# Patient Record
Sex: FEMALE | Race: BLACK OR AFRICAN AMERICAN | Employment: FULL TIME | URBAN - METROPOLITAN AREA
[De-identification: names, ages, dates, MRNs, and addresses within clinical notes are randomized per-mention and may not be internally consistent; named-entity substitution may affect disease eponyms.]

---

## 2019-04-04 ENCOUNTER — APPOINTMENT (OUTPATIENT)
Dept: CT IMAGING | Age: 24
DRG: 637 | End: 2019-04-04
Attending: EMERGENCY MEDICINE
Payer: SELF-PAY

## 2019-04-04 ENCOUNTER — HOSPITAL ENCOUNTER (INPATIENT)
Age: 24
LOS: 4 days | Discharge: HOME | DRG: 637 | End: 2019-04-08
Attending: EMERGENCY MEDICINE | Admitting: INTERNAL MEDICINE
Payer: SELF-PAY

## 2019-04-04 ENCOUNTER — APPOINTMENT (OUTPATIENT)
Dept: GENERAL RADIOLOGY | Age: 24
DRG: 637 | End: 2019-04-04
Attending: EMERGENCY MEDICINE
Payer: SELF-PAY

## 2019-04-04 DIAGNOSIS — E13.11 DIABETIC KETOACIDOSIS WITH COMA ASSOCIATED WITH OTHER SPECIFIED DIABETES MELLITUS (HCC): Primary | ICD-10-CM

## 2019-04-04 DIAGNOSIS — G93.41 METABOLIC ENCEPHALOPATHY: ICD-10-CM

## 2019-04-04 PROBLEM — A41.9 SEPSIS (HCC): Status: ACTIVE | Noted: 2019-04-04

## 2019-04-04 PROBLEM — E87.20 METABOLIC ACIDOSIS: Status: ACTIVE | Noted: 2019-04-04

## 2019-04-04 PROBLEM — E11.10 DKA (DIABETIC KETOACIDOSES): Status: ACTIVE | Noted: 2019-04-04

## 2019-04-04 LAB
ADMINISTERED INITIALS, ADMINIT: NORMAL
ALBUMIN SERPL-MCNC: 3.5 G/DL (ref 3.5–5)
ALBUMIN/GLOB SERPL: 0.7 {RATIO} (ref 1.1–2.2)
ALP SERPL-CCNC: 197 U/L (ref 45–117)
ALT SERPL-CCNC: 36 U/L (ref 12–78)
AMPHET UR QL SCN: NEGATIVE
ANION GAP BLD CALC-SCNC: 26 MMOL/L (ref 10–20)
ANION GAP SERPL CALC-SCNC: 13 MMOL/L (ref 5–15)
ANION GAP SERPL CALC-SCNC: 22 MMOL/L (ref 5–15)
ANION GAP SERPL CALC-SCNC: 25 MMOL/L (ref 5–15)
APPEARANCE UR: CLEAR
ARTERIAL PATENCY WRIST A: ABNORMAL
ARTERIAL PATENCY WRIST A: ABNORMAL
AST SERPL-CCNC: 17 U/L (ref 15–37)
ATRIAL RATE: 117 BPM
BACTERIA URNS QL MICRO: NEGATIVE /HPF
BARBITURATES UR QL SCN: NEGATIVE
BASE DEFICIT BLDV-SCNC: <30 MMOL/L
BASOPHILS # BLD: 0.2 K/UL (ref 0–0.1)
BASOPHILS NFR BLD: 1 % (ref 0–1)
BDY SITE: ABNORMAL
BDY SITE: ABNORMAL
BENZODIAZ UR QL: NEGATIVE
BILIRUB SERPL-MCNC: 0.4 MG/DL (ref 0.2–1)
BILIRUB UR QL: NEGATIVE
BUN BLD-MCNC: 21 MG/DL (ref 9–20)
BUN SERPL-MCNC: 17 MG/DL (ref 6–20)
BUN SERPL-MCNC: 22 MG/DL (ref 6–20)
BUN SERPL-MCNC: 23 MG/DL (ref 6–20)
BUN/CREAT SERPL: 15 (ref 12–20)
BUN/CREAT SERPL: 17 (ref 12–20)
BUN/CREAT SERPL: 19 (ref 12–20)
CA-I BLD-MCNC: 1.33 MMOL/L (ref 1.12–1.32)
CALCIUM SERPL-MCNC: 8.2 MG/DL (ref 8.5–10.1)
CALCIUM SERPL-MCNC: 8.3 MG/DL (ref 8.5–10.1)
CALCIUM SERPL-MCNC: 9.3 MG/DL (ref 8.5–10.1)
CALCULATED P AXIS, ECG09: 75 DEGREES
CALCULATED R AXIS, ECG10: 76 DEGREES
CALCULATED T AXIS, ECG11: 41 DEGREES
CANNABINOIDS UR QL SCN: NEGATIVE
CHLORIDE BLD-SCNC: 111 MMOL/L (ref 98–107)
CHLORIDE SERPL-SCNC: 105 MMOL/L (ref 97–108)
CHLORIDE SERPL-SCNC: 118 MMOL/L (ref 97–108)
CHLORIDE SERPL-SCNC: 120 MMOL/L (ref 97–108)
CO2 BLD-SCNC: 6 MMOL/L (ref 21–32)
CO2 SERPL-SCNC: 14 MMOL/L (ref 21–32)
CO2 SERPL-SCNC: 5 MMOL/L (ref 21–32)
CO2 SERPL-SCNC: 5 MMOL/L (ref 21–32)
COCAINE UR QL SCN: NEGATIVE
COLOR UR: ABNORMAL
CREAT BLD-MCNC: 0.9 MG/DL (ref 0.6–1.3)
CREAT SERPL-MCNC: 0.98 MG/DL (ref 0.55–1.02)
CREAT SERPL-MCNC: 1.18 MG/DL (ref 0.55–1.02)
CREAT SERPL-MCNC: 1.49 MG/DL (ref 0.55–1.02)
D50 ADMINISTERED, D50ADM: 0 ML
D50 ORDER, D50ORD: 0 ML
DIAGNOSIS, 93000: NORMAL
DIFFERENTIAL METHOD BLD: ABNORMAL
DRUG SCRN COMMENT,DRGCM: NORMAL
EOSINOPHIL # BLD: 0 K/UL (ref 0–0.4)
EOSINOPHIL NFR BLD: 0 % (ref 0–7)
EPITH CASTS URNS QL MICRO: ABNORMAL /LPF
ERYTHROCYTE [DISTWIDTH] IN BLOOD BY AUTOMATED COUNT: 12.8 % (ref 11.5–14.5)
GAS FLOW.O2 O2 DELIVERY SYS: ABNORMAL L/MIN
GAS FLOW.O2 O2 DELIVERY SYS: ABNORMAL L/MIN
GLOBULIN SER CALC-MCNC: 4.9 G/DL (ref 2–4)
GLSCOM COMMENTS: NORMAL
GLUCOSE BLD STRIP.AUTO-MCNC: 156 MG/DL (ref 65–100)
GLUCOSE BLD STRIP.AUTO-MCNC: 169 MG/DL (ref 65–100)
GLUCOSE BLD STRIP.AUTO-MCNC: 171 MG/DL (ref 65–100)
GLUCOSE BLD STRIP.AUTO-MCNC: 183 MG/DL (ref 65–100)
GLUCOSE BLD STRIP.AUTO-MCNC: 254 MG/DL (ref 65–100)
GLUCOSE BLD STRIP.AUTO-MCNC: 377 MG/DL (ref 65–100)
GLUCOSE BLD STRIP.AUTO-MCNC: 589 MG/DL (ref 65–100)
GLUCOSE BLD STRIP.AUTO-MCNC: >600 MG/DL (ref 65–100)
GLUCOSE BLD-MCNC: >700 MG/DL (ref 65–100)
GLUCOSE SERPL-MCNC: 170 MG/DL (ref 65–100)
GLUCOSE SERPL-MCNC: 505 MG/DL (ref 65–100)
GLUCOSE SERPL-MCNC: 808 MG/DL (ref 65–100)
GLUCOSE UR STRIP.AUTO-MCNC: >1000 MG/DL
GLUCOSE, GLC: 156 MG/DL
GLUCOSE, GLC: 169 MG/DL
GLUCOSE, GLC: 171 MG/DL
GLUCOSE, GLC: 183 MG/DL
GLUCOSE, GLC: 254 MG/DL
GLUCOSE, GLC: 377 MG/DL
GLUCOSE, GLC: 589 MG/DL
GLUCOSE, GLC: 601 MG/DL
GLUCOSE, GLC: 601 MG/DL
HCG SERPL QL: NEGATIVE
HCO3 BLDV-SCNC: 3.1 MMOL/L (ref 23–28)
HCT VFR BLD AUTO: 45.7 % (ref 35–47)
HCT VFR BLD CALC: 48 % (ref 35–47)
HGB BLD-MCNC: 14.3 G/DL (ref 11.5–16)
HGB UR QL STRIP: ABNORMAL
HIGH TARGET, HITG: 250 MG/DL
HYALINE CASTS URNS QL MICRO: ABNORMAL /LPF (ref 0–5)
IMM GRANULOCYTES # BLD AUTO: 0.2 K/UL (ref 0–0.04)
IMM GRANULOCYTES NFR BLD AUTO: 1 % (ref 0–0.5)
INSULIN ADMINSTERED, INSADM: 1.9 UNITS/HOUR
INSULIN ADMINSTERED, INSADM: 10.8 UNITS/HOUR
INSULIN ADMINSTERED, INSADM: 15.9 UNITS/HOUR
INSULIN ADMINSTERED, INSADM: 16.2 UNITS/HOUR
INSULIN ADMINSTERED, INSADM: 2.2 UNITS/HOUR
INSULIN ADMINSTERED, INSADM: 2.2 UNITS/HOUR
INSULIN ADMINSTERED, INSADM: 2.5 UNITS/HOUR
INSULIN ADMINSTERED, INSADM: 3.9 UNITS/HOUR
INSULIN ADMINSTERED, INSADM: 6.3 UNITS/HOUR
INSULIN ORDER, INSORD: 1.9 UNITS/HOUR
INSULIN ORDER, INSORD: 10.8 UNITS/HOUR
INSULIN ORDER, INSORD: 15.9 UNITS/HOUR
INSULIN ORDER, INSORD: 16.2 UNITS/HOUR
INSULIN ORDER, INSORD: 2.2 UNITS/HOUR
INSULIN ORDER, INSORD: 2.2 UNITS/HOUR
INSULIN ORDER, INSORD: 2.5 UNITS/HOUR
INSULIN ORDER, INSORD: 3.9 UNITS/HOUR
INSULIN ORDER, INSORD: 6.3 UNITS/HOUR
KETONES SERPL QL: ABNORMAL
KETONES UR QL STRIP.AUTO: 80 MG/DL
LACTATE BLD-SCNC: 3.11 MMOL/L (ref 0.4–2)
LACTATE SERPL-SCNC: 1.2 MMOL/L (ref 0.4–2)
LACTATE SERPL-SCNC: 2.4 MMOL/L (ref 0.4–2)
LEUKOCYTE ESTERASE UR QL STRIP.AUTO: NEGATIVE
LOW TARGET, LOT: 150 MG/DL
LYMPHOCYTES # BLD: 2.4 K/UL (ref 0.8–3.5)
LYMPHOCYTES NFR BLD: 10 % (ref 12–49)
MAGNESIUM SERPL-MCNC: 2.5 MG/DL (ref 1.6–2.4)
MAGNESIUM SERPL-MCNC: 2.7 MG/DL (ref 1.6–2.4)
MCH RBC QN AUTO: 27.6 PG (ref 26–34)
MCHC RBC AUTO-ENTMCNC: 31.3 G/DL (ref 30–36.5)
MCV RBC AUTO: 88.1 FL (ref 80–99)
METHADONE UR QL: NEGATIVE
MINUTES UNTIL NEXT BG, NBG: 60 MIN
MONOCYTES # BLD: 1.1 K/UL (ref 0–1)
MONOCYTES NFR BLD: 5 % (ref 5–13)
MULTIPLIER, MUL: 0.02
MULTIPLIER, MUL: 0.03
MULTIPLIER, MUL: 0.03
NEUTS SEG # BLD: 19.2 K/UL (ref 1.8–8)
NEUTS SEG NFR BLD: 83 % (ref 32–75)
NITRITE UR QL STRIP.AUTO: NEGATIVE
NRBC # BLD: 0 K/UL (ref 0–0.01)
NRBC BLD-RTO: 0 PER 100 WBC
OPIATES UR QL: NEGATIVE
ORDER INITIALS, ORDINIT: NORMAL
P-R INTERVAL, ECG05: 138 MS
PCO2 BLDV: 18.8 MMHG (ref 41–51)
PCO2 BLDV: <17 MMHG (ref 41–51)
PCP UR QL: NEGATIVE
PH BLDV: 6.83 [PH] (ref 7.32–7.42)
PH BLDV: 6.91 [PH] (ref 7.32–7.42)
PH UR STRIP: 5 [PH] (ref 5–8)
PHOSPHATE SERPL-MCNC: 1.4 MG/DL (ref 2.6–4.7)
PHOSPHATE SERPL-MCNC: 4.2 MG/DL (ref 2.6–4.7)
PLATELET # BLD AUTO: 512 K/UL (ref 150–400)
PMV BLD AUTO: 10.1 FL (ref 8.9–12.9)
PO2 BLDV: 53 MMHG (ref 25–40)
PO2 BLDV: 68 MMHG (ref 25–40)
POTASSIUM BLD-SCNC: 5.5 MMOL/L (ref 3.5–5.1)
POTASSIUM SERPL-SCNC: 3.7 MMOL/L (ref 3.5–5.1)
POTASSIUM SERPL-SCNC: 4.1 MMOL/L (ref 3.5–5.1)
POTASSIUM SERPL-SCNC: 5.4 MMOL/L (ref 3.5–5.1)
PROCALCITONIN SERPL-MCNC: <0.1 NG/ML
PROT SERPL-MCNC: 8.4 G/DL (ref 6.4–8.2)
PROT UR STRIP-MCNC: 100 MG/DL
Q-T INTERVAL, ECG07: 372 MS
QRS DURATION, ECG06: 88 MS
QTC CALCULATION (BEZET), ECG08: 518 MS
RBC # BLD AUTO: 5.19 M/UL (ref 3.8–5.2)
RBC #/AREA URNS HPF: ABNORMAL /HPF (ref 0–5)
SAO2 % BLDV: 73 % (ref 65–88)
SERVICE CMNT-IMP: ABNORMAL
SODIUM BLD-SCNC: 137 MMOL/L (ref 136–145)
SODIUM SERPL-SCNC: 135 MMOL/L (ref 136–145)
SODIUM SERPL-SCNC: 145 MMOL/L (ref 136–145)
SODIUM SERPL-SCNC: 147 MMOL/L (ref 136–145)
SP GR UR REFRACTOMETRY: 1.02 (ref 1–1.03)
SPECIMEN TYPE: ABNORMAL
SPECIMEN TYPE: ABNORMAL
TOTAL RESP. RATE, ITRR: 24
TOTAL RESP. RATE, ITRR: 25
TROPONIN I SERPL-MCNC: <0.05 NG/ML
UA: UC IF INDICATED,UAUC: ABNORMAL
UROBILINOGEN UR QL STRIP.AUTO: 0.2 EU/DL (ref 0.2–1)
VENTRICULAR RATE, ECG03: 117 BPM
WBC # BLD AUTO: 23.1 K/UL (ref 3.6–11)
WBC URNS QL MICRO: ABNORMAL /HPF (ref 0–4)

## 2019-04-04 PROCEDURE — 74011250636 HC RX REV CODE- 250/636: Performed by: INTERNAL MEDICINE

## 2019-04-04 PROCEDURE — 74011000250 HC RX REV CODE- 250: Performed by: INTERNAL MEDICINE

## 2019-04-04 PROCEDURE — 65610000006 HC RM INTENSIVE CARE

## 2019-04-04 PROCEDURE — 99285 EMERGENCY DEPT VISIT HI MDM: CPT

## 2019-04-04 PROCEDURE — 84145 PROCALCITONIN (PCT): CPT

## 2019-04-04 PROCEDURE — 96367 TX/PROPH/DG ADDL SEQ IV INF: CPT

## 2019-04-04 PROCEDURE — 80048 BASIC METABOLIC PNL TOTAL CA: CPT

## 2019-04-04 PROCEDURE — 82803 BLOOD GASES ANY COMBINATION: CPT

## 2019-04-04 PROCEDURE — 74011636320 HC RX REV CODE- 636/320: Performed by: EMERGENCY MEDICINE

## 2019-04-04 PROCEDURE — 87040 BLOOD CULTURE FOR BACTERIA: CPT

## 2019-04-04 PROCEDURE — 83605 ASSAY OF LACTIC ACID: CPT

## 2019-04-04 PROCEDURE — 74011000258 HC RX REV CODE- 258: Performed by: INTERNAL MEDICINE

## 2019-04-04 PROCEDURE — 74011636637 HC RX REV CODE- 636/637: Performed by: EMERGENCY MEDICINE

## 2019-04-04 PROCEDURE — 80047 BASIC METABLC PNL IONIZED CA: CPT

## 2019-04-04 PROCEDURE — 74011000258 HC RX REV CODE- 258: Performed by: EMERGENCY MEDICINE

## 2019-04-04 PROCEDURE — 74177 CT ABD & PELVIS W/CONTRAST: CPT

## 2019-04-04 PROCEDURE — 36415 COLL VENOUS BLD VENIPUNCTURE: CPT

## 2019-04-04 PROCEDURE — 84703 CHORIONIC GONADOTROPIN ASSAY: CPT

## 2019-04-04 PROCEDURE — 74011250636 HC RX REV CODE- 250/636: Performed by: EMERGENCY MEDICINE

## 2019-04-04 PROCEDURE — 85025 COMPLETE CBC W/AUTO DIFF WBC: CPT

## 2019-04-04 PROCEDURE — 82962 GLUCOSE BLOOD TEST: CPT

## 2019-04-04 PROCEDURE — 93005 ELECTROCARDIOGRAM TRACING: CPT

## 2019-04-04 PROCEDURE — 96375 TX/PRO/DX INJ NEW DRUG ADDON: CPT

## 2019-04-04 PROCEDURE — 84100 ASSAY OF PHOSPHORUS: CPT

## 2019-04-04 PROCEDURE — 80307 DRUG TEST PRSMV CHEM ANLYZR: CPT

## 2019-04-04 PROCEDURE — 74011250637 HC RX REV CODE- 250/637: Performed by: INTERNAL MEDICINE

## 2019-04-04 PROCEDURE — 71045 X-RAY EXAM CHEST 1 VIEW: CPT

## 2019-04-04 PROCEDURE — 74011000250 HC RX REV CODE- 250: Performed by: EMERGENCY MEDICINE

## 2019-04-04 PROCEDURE — 81001 URINALYSIS AUTO W/SCOPE: CPT

## 2019-04-04 PROCEDURE — 74011000258 HC RX REV CODE- 258: Performed by: HOSPITALIST

## 2019-04-04 PROCEDURE — 82009 KETONE BODYS QUAL: CPT

## 2019-04-04 PROCEDURE — 80053 COMPREHEN METABOLIC PANEL: CPT

## 2019-04-04 PROCEDURE — 83735 ASSAY OF MAGNESIUM: CPT

## 2019-04-04 PROCEDURE — 96361 HYDRATE IV INFUSION ADD-ON: CPT

## 2019-04-04 PROCEDURE — 77030013079 HC BLNKT BAIR HGGR 3M -A

## 2019-04-04 PROCEDURE — 96365 THER/PROPH/DIAG IV INF INIT: CPT

## 2019-04-04 PROCEDURE — 70450 CT HEAD/BRAIN W/O DYE: CPT

## 2019-04-04 PROCEDURE — 84484 ASSAY OF TROPONIN QUANT: CPT

## 2019-04-04 RX ORDER — MUPIROCIN 20 MG/G
OINTMENT TOPICAL EVERY 12 HOURS
Status: DISCONTINUED | OUTPATIENT
Start: 2019-04-04 | End: 2019-04-08 | Stop reason: HOSPADM

## 2019-04-04 RX ORDER — INSULIN LISPRO 100 [IU]/ML
INJECTION, SOLUTION INTRAVENOUS; SUBCUTANEOUS
COMMUNITY

## 2019-04-04 RX ORDER — SODIUM CHLORIDE 0.9 % (FLUSH) 0.9 %
5-40 SYRINGE (ML) INJECTION EVERY 8 HOURS
Status: DISCONTINUED | OUTPATIENT
Start: 2019-04-04 | End: 2019-04-08 | Stop reason: HOSPADM

## 2019-04-04 RX ORDER — SODIUM CHLORIDE 450 MG/100ML
200 INJECTION, SOLUTION INTRAVENOUS CONTINUOUS
Status: DISCONTINUED | OUTPATIENT
Start: 2019-04-04 | End: 2019-04-04

## 2019-04-04 RX ORDER — LORAZEPAM 2 MG/ML
1 INJECTION INTRAMUSCULAR ONCE
Status: COMPLETED | OUTPATIENT
Start: 2019-04-04 | End: 2019-04-04

## 2019-04-04 RX ORDER — SODIUM BICARBONATE 84 MG/ML
50 INJECTION, SOLUTION INTRAVENOUS
Status: COMPLETED | OUTPATIENT
Start: 2019-04-04 | End: 2019-04-04

## 2019-04-04 RX ORDER — SODIUM CHLORIDE 9 MG/ML
200 INJECTION, SOLUTION INTRAVENOUS CONTINUOUS
Status: DISCONTINUED | OUTPATIENT
Start: 2019-04-04 | End: 2019-04-04

## 2019-04-04 RX ORDER — HYDROCODONE BITARTRATE AND ACETAMINOPHEN 5; 325 MG/1; MG/1
1 TABLET ORAL
Status: DISCONTINUED | OUTPATIENT
Start: 2019-04-04 | End: 2019-04-06

## 2019-04-04 RX ORDER — SODIUM CHLORIDE 0.9 % (FLUSH) 0.9 %
5-40 SYRINGE (ML) INJECTION AS NEEDED
Status: DISCONTINUED | OUTPATIENT
Start: 2019-04-04 | End: 2019-04-08 | Stop reason: HOSPADM

## 2019-04-04 RX ORDER — MAGNESIUM SULFATE 100 %
4 CRYSTALS MISCELLANEOUS AS NEEDED
Status: DISCONTINUED | OUTPATIENT
Start: 2019-04-04 | End: 2019-04-05

## 2019-04-04 RX ORDER — IBUPROFEN 200 MG
TABLET ORAL
COMMUNITY

## 2019-04-04 RX ORDER — SODIUM BICARBONATE 84 MG/ML
100 INJECTION, SOLUTION INTRAVENOUS ONCE
Status: COMPLETED | OUTPATIENT
Start: 2019-04-04 | End: 2019-04-04

## 2019-04-04 RX ORDER — MORPHINE SULFATE 2 MG/ML
1 INJECTION, SOLUTION INTRAMUSCULAR; INTRAVENOUS
Status: DISCONTINUED | OUTPATIENT
Start: 2019-04-04 | End: 2019-04-05

## 2019-04-04 RX ORDER — ONDANSETRON 2 MG/ML
4 INJECTION INTRAMUSCULAR; INTRAVENOUS
Status: DISCONTINUED | OUTPATIENT
Start: 2019-04-04 | End: 2019-04-08 | Stop reason: HOSPADM

## 2019-04-04 RX ORDER — HEPARIN SODIUM 5000 [USP'U]/ML
5000 INJECTION, SOLUTION INTRAVENOUS; SUBCUTANEOUS EVERY 8 HOURS
Status: DISCONTINUED | OUTPATIENT
Start: 2019-04-04 | End: 2019-04-08 | Stop reason: HOSPADM

## 2019-04-04 RX ORDER — VANCOMYCIN HYDROCHLORIDE
1250
Status: DISCONTINUED | OUTPATIENT
Start: 2019-04-04 | End: 2019-04-04

## 2019-04-04 RX ORDER — DEXTROSE MONOHYDRATE 25 G/50ML
25-50 INJECTION, SOLUTION INTRAVENOUS AS NEEDED
Status: DISCONTINUED | OUTPATIENT
Start: 2019-04-04 | End: 2019-04-05

## 2019-04-04 RX ORDER — SODIUM CHLORIDE 0.9 % (FLUSH) 0.9 %
10 SYRINGE (ML) INJECTION
Status: COMPLETED | OUTPATIENT
Start: 2019-04-04 | End: 2019-04-04

## 2019-04-04 RX ORDER — DEXTROSE MONOHYDRATE AND SODIUM CHLORIDE 5; .45 G/100ML; G/100ML
125 INJECTION, SOLUTION INTRAVENOUS CONTINUOUS
Status: DISCONTINUED | OUTPATIENT
Start: 2019-04-04 | End: 2019-04-05

## 2019-04-04 RX ORDER — NALOXONE HYDROCHLORIDE 0.4 MG/ML
0.4 INJECTION, SOLUTION INTRAMUSCULAR; INTRAVENOUS; SUBCUTANEOUS AS NEEDED
Status: DISCONTINUED | OUTPATIENT
Start: 2019-04-04 | End: 2019-04-08 | Stop reason: HOSPADM

## 2019-04-04 RX ADMIN — DEXTROSE MONOHYDRATE AND SODIUM CHLORIDE 125 ML/HR: 5; .45 INJECTION, SOLUTION INTRAVENOUS at 20:45

## 2019-04-04 RX ADMIN — VANCOMYCIN HYDROCHLORIDE 1250 MG: 10 INJECTION, POWDER, LYOPHILIZED, FOR SOLUTION INTRAVENOUS at 15:28

## 2019-04-04 RX ADMIN — SODIUM CHLORIDE 10.8 UNITS/HR: 900 INJECTION, SOLUTION INTRAVENOUS at 13:00

## 2019-04-04 RX ADMIN — SODIUM CHLORIDE 200 ML: 450 INJECTION, SOLUTION INTRAVENOUS at 15:30

## 2019-04-04 RX ADMIN — Medication 10 ML: at 17:30

## 2019-04-04 RX ADMIN — PIPERACILLIN AND TAZOBACTAM 3.38 G: 3; .375 INJECTION, POWDER, FOR SOLUTION INTRAVENOUS at 13:31

## 2019-04-04 RX ADMIN — FAMOTIDINE 20 MG: 10 INJECTION, SOLUTION INTRAVENOUS at 22:07

## 2019-04-04 RX ADMIN — SODIUM CHLORIDE 1000 ML: 900 INJECTION, SOLUTION INTRAVENOUS at 11:02

## 2019-04-04 RX ADMIN — LORAZEPAM 1 MG: 2 INJECTION INTRAMUSCULAR; INTRAVENOUS at 15:21

## 2019-04-04 RX ADMIN — Medication 10 ML: at 13:58

## 2019-04-04 RX ADMIN — SODIUM CHLORIDE 1000 ML: 900 INJECTION, SOLUTION INTRAVENOUS at 17:28

## 2019-04-04 RX ADMIN — IOPAMIDOL 100 ML: 755 INJECTION, SOLUTION INTRAVENOUS at 13:58

## 2019-04-04 RX ADMIN — HEPARIN SODIUM 5000 UNITS: 5000 INJECTION INTRAVENOUS; SUBCUTANEOUS at 22:11

## 2019-04-04 RX ADMIN — SODIUM CHLORIDE 1000 ML: 900 INJECTION, SOLUTION INTRAVENOUS at 11:27

## 2019-04-04 RX ADMIN — SODIUM BICARBONATE 100 MEQ: 84 INJECTION, SOLUTION INTRAVENOUS at 18:59

## 2019-04-04 RX ADMIN — MUPIROCIN: 20 OINTMENT TOPICAL at 22:12

## 2019-04-04 RX ADMIN — SODIUM CHLORIDE 200 ML/HR: 450 INJECTION, SOLUTION INTRAVENOUS at 19:21

## 2019-04-04 RX ADMIN — SODIUM BICARBONATE 50 MEQ: 84 INJECTION, SOLUTION INTRAVENOUS at 12:16

## 2019-04-04 RX ADMIN — CEFTRIAXONE 1 G: 1 INJECTION, POWDER, FOR SOLUTION INTRAMUSCULAR; INTRAVENOUS at 12:08

## 2019-04-04 RX ADMIN — PIPERACILLIN AND TAZOBACTAM 3.38 G: 3; .375 INJECTION, POWDER, FOR SOLUTION INTRAVENOUS at 19:05

## 2019-04-04 RX ADMIN — Medication 10 ML: at 22:13

## 2019-04-04 RX ADMIN — HEPARIN SODIUM 5000 UNITS: 5000 INJECTION INTRAVENOUS; SUBCUTANEOUS at 15:21

## 2019-04-04 NOTE — PROGRESS NOTES
Arrived from ED at 1600, placed into bed and VS obtained. Insulin drip still infusing, will get FS shortly. Chlohex bath quickly given.

## 2019-04-04 NOTE — PROGRESS NOTES
DISCHARGE:  D: Patient with orders to discharge to home.  I: Discharge instructions, medications & follow ups reviewed with patient. Copy of discharge summary given to patient. PIV removed. All belongings packed & sent with patient. Medications filled & picked up at discharge pharmacy.   A: Patient in stable condition. AVSS. Patient had no further questions regarding discharge instructions and medications. Patient transferred out by self & left with boyfriend.  P: [place] [facility] [place]up prenatal appointments next month.     Pharmacy Automatic Renal Dosing Protocol - Antimicrobials Indication for Antimicrobials: sepsis, bloodstream infection Current Regimen of Each Antimicrobial: 
Vancomycin 1260mg once, then 750mg q12h (Start Date ; Day # 1) Zosyn 3.375g q8h Previous Antimicrobial Therapy: 
NA 
 
Goal Level: VANCOMYCIN TROUGH GOAL RANGE Vancomycin Trough: 15 - 20 mcg/mL Date Dose & Interval Measured (mcg/mL) Extrapolated (mcg/mL) Date & time of next level: prior to 0300 dose on 19 Significant Cultures:  
Blood cx pending Radiology / Imaging results: (X-ray, CT scan or MRI): NA 
 
Paralysis, amputations, malnutrition: NA 
 
Labs: 
Recent Labs 19 
1106 CREA 1.49* BUN 23* WBC 23.1* Temp (24hrs), Av °F (36.7 °C), Min:98 °F (36.7 °C), Max:98 °F (36.7 °C) Creatinine Clearance (mL/min) or Dialysis: 52 Impression/Plan:  
Start vancomycin 1250mg once, then 750mg q12h for projected trough level of 16.9. Zosyn 3.375g q8h. Adjust as needed for renal fxn changes. Antimicrobial stop date to be determined Pharmacy will follow daily and adjust medications as appropriate for renal function and/or serum levels. Thank you, 
Cassy Burden, [facility] Recommended duration of therapy 
http://[facility]/[place]/[facility]/Pharmacy/Clinical%20Companion/Duration%20of%20ABX%20therapy. docx Renal Dosing 
http://[facility]/[place]/[facility]/Pharmacy/Clinical%20Companion/Renal%20Dosing%[id number]. pdf

## 2019-04-04 NOTE — DIABETES MGMT
DTC Progress Note Recommendations/ Comments:appreciate consult. Pt currently with BG of >800 mg/dL, and noted to be disoriented, agitated per RN documentation and therefore not appropriate for education at this time. DTC to f/u with pt when pt more appropriate. Please consider continuing insulin gtt until anion gap is less than 12 on two consecutive BMPs and BG is less than 200 mg/dl and transition per hospital guidelines. Current hospital DM medication: insulin gtt Chart reviewed on Sarah Baires. Patient is a 24 y.o. female with known DM on humalog 50/50 at home. A1c:  
No results found for: HBA1C, HGBE8, LUP7IHMH Recent Glucose Results:  
Lab Results Component Value Date/Time  () 04/04/2019 11:06 AM  
 GLUCPOC >700 () 04/04/2019 11:14 AM  
 GLUCPOC >600 () 04/04/2019 11:12 AM  
 GLUCPOC >600 ([facility]) 04/04/2019 11:08 AM  
  
 
Lab Results Component Value Date/Time Creatinine 1.49 (H) 04/04/2019 11:06 AM  
 
Estimated Creatinine Clearance: 52.4 mL/min (A) (based on SCr of 1.49 mg/dL (H)). Active Orders Diet DIET CLEAR LIQUID  
 DIET NPO  
  
 
PO intake: No data found. Will continue to follow as needed. Thank you Hesham Rios [facility] Office: [phone] Time spent: 4 minutes

## 2019-04-04 NOTE — PROGRESS NOTES
Pharmacy Automatic Renal Dosing Protocol - Antimicrobials Indication for Antimicrobials: sepsis Current Regimen of Each Antimicrobial: 
Zosyn 3.375 g IV q12h (Start Date ; Day # 1) Significant Cultures:  
 
 Blood- in process Radiology / Imaging results: (X-ray, CT scan or MRI):  
 CXR: 
IMPRESSION: No acute cardiopulmonary disease. Paralysis, amputations, malnutrition:   
 
Labs: 
Recent Labs 19 
1106 CREA 1.49* BUN 23* WBC 23.1* Temp (24hrs), Av °F (36.7 °C), Min:98 °F (36.7 °C), Max:98 °F (36.7 °C) Creatinine Clearance (mL/min) or Dialysis: 52.4 mL/min Impression/Plan:  
Adjust Zosyn to 3.375 g IV q8h over 4 hours beginning at 1900. Zosyn 3.375 g x1 given at 1330 Antimicrobial stop date TBD Pharmacy will follow daily and adjust medications as appropriate for renal function and/or serum levels. Thank you, 
Hiwot Espino Recommended duration of therapy 
http://[facility]/[place]/[facility]/Pharmacy/Clinical%20Companion/Duration%20of%20ABX%20therapy. docx Renal Dosing 
http://[facility]/[place]/[facility]/Pharmacy/Clinical%20Companion/Renal%20Dosing%[id number]. pdf

## 2019-04-04 NOTE — ED NOTES
Informed Dr. Cal Ugarte that patient is screaming badly and will not sit still and just urinated all over the floor.

## 2019-04-04 NOTE — PROGRESS NOTES
Pharmacy Clarification of the Prior to Admission Medication Regimen Retrospective to the Admission Medication Reconciliation-Follow up Needed The patient was unable to participate in interview regarding clarification of the prior to admission medication regimen. Patient moaning and flailing in her stretcher. When questioned regarding her medications, patient stated 'bag'. MHT located a purple haroldo pack laying beside the patient in the bed. MHT asked to look in the bag, and patient stated 'yeah'. Inside the bag was a [employer], a bottle of ibuprofen, some syringes, and personal items. MHT questioned the patient regarding the insulin pen and IBU, patient kept stating 'I don't know' and asking for 'insulin' and 'trash'. Information Obtained From: medication bottles Recommendations/Findings: The following amendments were made to the patient's active medication list on file at [facility]:  
 
1) Additions:  
ibuprofen (MOTRIN) 200 mg tablet 
insulin lispro (HUMALOG KWIKPEN INSULIN) 100 unit/mL kwikpen 2) Removals:  
Humalog mix 50/50 3) Changes: NONE 4) Pertinent Pharmacy Findings: 
Updated patient?s preferred outpatient pharmacy to: [address] [place] [address] at [address] [facility], [address] [place] [address] The medication history will need to be re-evaluated at a later time during admission when patient is willing/able to participate or if more information is provided. PTA medication list was corrected to the following:  
 
Prior to Admission Medications Prescriptions Last Dose Informant Patient Reported? Taking?  
ibuprofen (MOTRIN) 200 mg tablet Unknown at Unknown time Other Yes No  
insulin lispro (HUMALOG KWIKPEN INSULIN) 100 unit/mL kwikpen Unknown at Unknown time Other Yes No  
  
Facility-Administered Medications: None Thank you, 
Jennifer Rock CPhT Medication History Pharmacy Technician

## 2019-04-04 NOTE — H&P
Hospitalist Admission NoteNAME: Morgan Mario :  1995 MRN:  [id number] Date/Time:  2019 1:46 PM 
 
Patient PCP: Unknown, Provider 
________________________________________________________________________ Given the patient's current clinical presentation, I have a high level of concern for decompensation if discharged from the emergency department. Complex decision making was performed, which includes reviewing the patient's available past medical records, laboratory results, and x-ray films. My assessment of this patient's clinical condition and my plan of care is as follows. Assessment / Plan: 
 
DKA Severe metabolic acidosis Hyperkalemia Pseudohyponatremia Metabolic encephalopathy Will admit to ICU, continue with insulin drip started by the ED physician Patient got 1 amp of bicarb, will follow-up repeat BMP every 4, magnesium and phosphorus every 4  
metabolic acidosis should resolve with the insulin drip, continue with IV fluid with  Half normal saline at 200 cc/h as corrected na 147.  Change the fluid to D5 half normal saline if blood sugar is below 250 Once anion gap closed x2, insulin drip can be stopped and transitioned to home insulin regimen Potassium should come down with the insulin drip C/w neuro-checks . Pt is very agitated , one time ativan ordered QTc > 500 will avoid haldol Acute kidney injury, most likely prerenal 
Creatinine 1.45, continue IV fluid Severe sepsis with unknown origin Lactic acidosis from DKA versus sepsis Tachycardic plus white blood cell count 23K 
UA negative chest x-ray negative CT abdomen and pelvis results : no acute pathology In the meanwhile will cover with broad-spectrum antibiotics IV Zosyn and vancomycin Will get pro-calcitonin. Stop abx if procalcitonin neg Trend lactic acid until resolution Body mass index is 24.73 kg/m². therefore classifying patient as normal wt I have personally reviewed the radiographs, laboratory data in Epic and decisions and statements above are based partially on this personal interpretation. Code Status: Full Code DVT Prophylaxis: Lovenox GI Prophylaxis: not indicated Subjective: CHIEF COMPLAINT: n/v and screaming HISTORY OF PRESENT ILLNESS:    
Nikunj Reynolds is a 24 y.o.  female with known history as listed below presents to ED with complaint noted above. Available records were reviewed at the time of H&P. This is a 23-year-old female with past medical history of diabetes mellitus insulin-dependent who was sent to the ED via EMS for nausea vomiting and abdominal pain and Ams .  Per EMS note, patient was found screaming in 1 of the [facility] room, patient reportedly haven't  been using her insulin for the past 3-4 days for unknown reason. Patient is from out of town and traveling to [facility] to work as an employee 
 All the HPI obtained from the ED notes as patient was found confused and unable to contribute any history. In the ED, patient was found to be tachycardic, her labs showed elevated white blood cell count 23,000 with a left shift, anion gap metabolic acidosis concerning for DKA.  Her blood sugar was able 800.  Her VBG showed severe metabolic acidosis with pH less than 7.  Her creatinine 1.49 When seen, patient was restless screaming and holding her abdomen. We were asked to see for work up and evaluation of the above problems. Past Medical History:  
Diagnosis Date  Diabetes mellitus ([address] History reviewed. No pertinent surgical history. Social History Tobacco Use  Smoking status: Never Smoker  Smokeless tobacco: Never Used Substance Use Topics  Alcohol use: Not on file History reviewed. No pertinent family history. No Known Allergies Prior to Admission medications Medication Sig Start Date End Date Taking? Authorizing Provider insulin lispro protamine/insulin lispro (HUMALOG MIX 50-50 INSULN U-100) 100 unit/mL (50-50) injection by SubCUTAneous route. Yes Other, MD Audie  
 
REVIEW OF SYSTEMS:  See HPI for details Patient was not able to provide review of systems due to mental status change/acute illness Objective: VITALS:   
Visit Vitals /76 Pulse (!) 116 Temp 98 °F (36.7 °C) Resp 24 Ht 5' 5\" (1.651 m) Wt 67.4 kg (148 lb 9.4 oz) SpO2 100% BMI 24.73 kg/m² PHYSICAL EXAM:  
General:    Alert, turning and tossing for unknown reason ,just scream  
HEENT: Atraumatic, anicteric sclerae, pink conjunctivae No oral ulcers, mucosa dry  throat clear Neck:  Supple, symmetrical,  thyroid: non tender Lungs:   Clear to auscultation bilaterally. No Wheezing or Rhonchi. No rales. Chest wall:  No tenderness  No Accessory muscle use. Heart:   Fast Regular  rhythm,  No  murmur   No edema Abdomen:   Soft, non-tender. Not distended. Bowel sounds normal 
Extremities: No cyanosis. No clubbing Skin:     Not pale. Not Jaundiced  No rashes Psych:  Unable to assess Neurologic: Confused , aaox0 _____________________________________________________ Care Plan discussed with: 
  Comments Patient Family RN x Care Manager Consultant:  danna BENITES MD  
_______________________________________________________________________ Recommended Disposition:  
Home with Family HH/PT/OT/RN   
SNF/LTC   
[facility]   
________________________________________________________________________ TOTAL TIME:  65 Minutes Critical Care Provided     Minutes non procedure based Comments >50% of visit spent in counseling and coordination of care x Chart review Discussion with patient and/or family and questions answered  
 
________________________________________________________________________ Signed: Jody Bautista MD 
 
This note will not be viewable in [address] Procedures: see electronic medical records for all procedures/Xrays and details which were not copied into this note but were reviewed prior to creation of Plan. LAB DATA REVIEWED:   
Recent Results (from the past 24 hour(s)) POC LACTIC ACID Collection Time: 04/04/19 11:05 AM  
Result Value Ref Range Lactic Acid (POC) 3.11 (HH) 0.40 - 2.00 mmol/L  
CBC WITH AUTOMATED DIFF Collection Time: 04/04/19 11:06 AM  
Result Value Ref Range WBC 23.1 (H) 3.6 - 11.0 K/uL  
 RBC 5.19 3.80 - 5.20 M/uL  
 HGB 14.3 11.5 - 16.0 g/dL HCT 45.7 35.0 - 47.0 % MCV 88.1 80.0 - 99.0 FL  
 MCH 27.6 26.0 - 34.0 PG  
 MCHC 31.3 30.0 - 36.5 g/dL  
 RDW 12.8 11.5 - 14.5 % PLATELET 883 (H) 499 - 400 K/uL MPV 10.1 8.9 - 12.9 FL  
 NRBC 0.0 0  WBC ABSOLUTE NRBC 0.00 0.00 - 0.01 K/uL NEUTROPHILS 83 (H) 32 - 75 % LYMPHOCYTES 10 (L) 12 - 49 % MONOCYTES 5 5 - 13 % EOSINOPHILS 0 0 - 7 % BASOPHILS 1 0 - 1 % IMMATURE GRANULOCYTES 1 (H) 0.0 - 0.5 % ABS. NEUTROPHILS 19.2 (H) 1.8 - 8.0 K/UL  
 ABS. LYMPHOCYTES 2.4 0.8 - 3.5 K/UL  
 ABS. MONOCYTES 1.1 (H) 0.0 - 1.0 K/UL  
 ABS. EOSINOPHILS 0.0 0.0 - 0.4 K/UL  
 ABS. BASOPHILS 0.2 (H) 0.0 - 0.1 K/UL  
 ABS. IMM. GRANS. 0.2 (H) 0.00 - 0.04 K/UL  
 DF AUTOMATED    
HCG QL SERUM Collection Time: 04/04/19 11:06 AM  
Result Value Ref Range HCG, Ql. NEGATIVE  NEG    
TROPONIN I Collection Time: 04/04/19 11:06 AM  
Result Value Ref Range Troponin-I, Qt. <0.05 <0.05 ng/mL URINALYSIS W/ REFLEX CULTURE Collection Time: 04/04/19 11:06 AM  
Result Value Ref Range Color YELLOW/STRAW Appearance CLEAR CLEAR Specific gravity 1.023 1.003 - 1.030    
 pH (UA) 5.0 5.0 - 8.0 Protein 100 (A) NEG mg/dL Glucose >1,000 (A) NEG mg/dL Ketone 80 (A) NEG mg/dL Bilirubin NEGATIVE  NEG Blood SMALL (A) NEG Urobilinogen 0.2 0.2 - 1.0 EU/dL Nitrites NEGATIVE  NEG  Leukocyte Esterase NEGATIVE  NEG    
 WBC 0-4 0 - 4 /hpf  
 RBC 0-5 0 - 5 /hpf Epithelial cells FEW FEW /lpf Bacteria NEGATIVE  NEG /hpf  
 UA:UC IF INDICATED CULTURE NOT INDICATED BY UA RESULT CNI Hyaline cast 0-2 0 - 5 /lpf METABOLIC PANEL, COMPREHENSIVE Collection Time: 04/04/19 11:06 AM  
Result Value Ref Range Sodium 135 (L) 136 - 145 mmol/L Potassium 5.4 (H) 3.5 - 5.1 mmol/L Chloride 105 97 - 108 mmol/L  
 CO2 5 (LL) 21 - 32 mmol/L Anion gap 25 (H) 5 - 15 mmol/L Glucose 808 (HH) 65 - 100 mg/dL BUN 23 (H) 6 - 20 MG/DL Creatinine 1.49 (H) 0.55 - 1.02 MG/DL  
 BUN/Creatinine ratio 15 12 - 20 GFR est AA 52 (L) >60 ml/min/1.73m2 GFR est non-AA 43 (L) >60 ml/min/1.73m2 Calcium 9.3 8.5 - 10.1 MG/DL Bilirubin, total 0.4 0.2 - 1.0 MG/DL  
 ALT (SGPT) 36 12 - 78 U/L  
 AST (SGOT) 17 15 - 37 U/L Alk. phosphatase 197 (H) 45 - 117 U/L Protein, total 8.4 (H) 6.4 - 8.2 g/dL Albumin 3.5 3.5 - 5.0 g/dL Globulin 4.9 (H) 2.0 - 4.0 g/dL A-G Ratio 0.7 (L) 1.1 - 2.2 GLUCOSE, POC Collection Time: 04/04/19 11:08 AM  
Result Value Ref Range Glucose (POC) >600 (HH) 65 - 100 mg/dL Performed by Kay Bender (CON) GLUCOSE, POC Collection Time: 04/04/19 11:12 AM  
Result Value Ref Range Glucose (POC) >600 (HH) 65 - 100 mg/dL Performed by Kay Bender (CON) POC CHEM8 Collection Time: 04/04/19 11:14 AM  
Result Value Ref Range Calcium, ionized (POC) 1.33 (H) 1.12 - 1.32 mmol/L Sodium (POC) 137 136 - 145 mmol/L Potassium (POC) 5.5 (H) 3.5 - 5.1 mmol/L Chloride (POC) 111 (H) 98 - 107 mmol/L  
 CO2 (POC) 6 (LL) 21 - 32 mmol/L Anion gap (POC) 26 (H) 10 - 20 mmol/L Glucose (POC) >700 (HH) 65 - 100 mg/dL BUN (POC) 21 (H) 9 - 20 mg/dL Creatinine (POC) 0.9 0.6 - 1.3 mg/dL GFRAA, POC >60 >60 ml/min/1.73m2 GFRNA, POC >60 >60 ml/min/1.73m2 Hematocrit (POC) 48 (H) 35.0 - 47.0 % Comment Notified RN or MD immediately by  POC VENOUS BLOOD GAS  
 Collection Time: 04/04/19 11:29 AM  
Result Value Ref Range Device: ROOM AIR    
 pH, venous (POC) 6.826 (LL) 7.32 - 7.42    
 pCO2, venous (POC) 18.8 (L) 41 - 51 MMHG  
 pO2, venous (POC) 68 (H) 25 - 40 mmHg HCO3, venous (POC) 3.1 (L) 23.0 - 28.0 MMOL/L  
 sO2, venous (POC) 73 65 - 88 % Base deficit, venous (POC) <30 mmol/L Allens test (POC) N/A Total resp. rate 25 Site OTHER Specimen type (POC) VENOUS BLOOD    
EKG, 12 LEAD, INITIAL Collection Time: 04/04/19 12:22 PM  
Result Value Ref Range Ventricular Rate 117 BPM  
 Atrial Rate 117 BPM  
 P-R Interval 138 ms QRS Duration 88 ms Q-T Interval 372 ms QTC Calculation (Bezet) 518 ms Calculated P Axis 75 degrees Calculated R Axis 76 degrees Calculated T Axis 41 degrees Diagnosis Sinus tachycardia Biatrial enlargement Nonspecific ST abnormality No previous ECGs available ACETONE/KETONE, QL Collection Time: 04/04/19 12:37 PM  
Result Value Ref Range Acetone/Ketone serum, QL. LARGE (A) NEG       
GLUCOSTABILIZER Collection Time: 04/04/19  1:05 PM  
Result Value Ref Range Glucose 601 mg/dL Insulin order 10.8 units/hour Insulin adminstered 10.8 units/hour Multiplier 0.020 Low target 150 mg/dL High target 250 mg/dL D50 order 0.0 ml  
 D50 administered 0.00 ml Minutes until next BG 60 min Order initials arm Administered initials arm GLSCOM Comments

## 2019-04-04 NOTE — PROGRESS NOTES
1545 TRANSFER - IN REPORT: 
 
Verbal report received from Cande ZAVALETA RN(name) on Derrick Masker  being received from ED(unit) for routine progression of care Report consisted of patients Situation, Background, Assessment and  
Recommendations(SBAR). Information from the following report(s) SBAR, Kardex, ED Summary, Intake/Output, MAR and Recent Results was reviewed with the receiving nurse. Opportunity for questions and clarification was provided. Assessment completed upon patients arrival to unit and care assumed. 1600 Pt arrived to the unit. 1645 Discussed pt's temp of 93.5 with Rick Samuel MD gave a verbal order to place the pt on a sai hugger. Pt placed the pt on a sai hugger. 2000 Pt's temp is 98. Removed sai hugger. 2030 Pt stated her pain was a 8 out of 10 at 8 pm. Pt now sleeping upon entering the room. Will hold off on giving pain medication. 0005 Messaged tele-hospitalist to inform of CO2 of 14. 
 
0015 Telehospitalist, MD [place] called back and verified the pt's CO2 level of 14. Ariel Martell MD stated he would changed the pt's fluid orders. 171 Manuel Schmidt MD called back to verify pt's HR above 100 since admission to the unit. No orders received. 
 
0300 Verbal shift change report given to Ruby MCCLENDON RN (oncoming nurse) by Kathryn Nam RN (offgoing nurse). Report included the following information SBAR, Kardex, ED Summary, Intake/Output, MAR, Recent Results and Cardiac Rhythm Sinus Tach.

## 2019-04-04 NOTE — ED PROVIDER NOTES
EMERGENCY DEPARTMENT HISTORY AND PHYSICAL EXAM 
 
 
Date: 4/4/2019 Patient Name: Derrick Aponte History of Presenting Illness Chief Complaint Patient presents with  
 High Blood Sugar  
  per ems, patient was found screaming at [facility] and acting out, patients BS has been reading high and last took her Humalog yesterday, patient is complaining of RLQ pain History Provided By: Patient HPI: Derrick Aponte, 24 y.o. female with PMHx significant for diabetes, presents to the ED with cc of severe vomiting, altered mental status, abdominal pain, and fatigue over the last 24 hours. Patient symptoms have been progressive per her coworkers. Patient is from out of town and traveling to [facility] to work as an employee. She is unable to provide any medical history due to the severity of her condition. While I can gather is that she has had a gradual come decline over the last 24-36 hours. Her abdominal pain appears to be diffuse. She reports feeling very thirsty. She is unable to state whether she has been taking her insulin. There is no other associated symptoms. No other exacerbating or ameliorating factors. There are no other complaints, changes, or physical findings at this time. PCP: Unknown, Provider No current facility-administered medications on file prior to encounter. Current Outpatient Medications on File Prior to Encounter Medication Sig Dispense Refill  insulin lispro (HUMALOG KWIKPEN INSULIN) 100 unit/mL kwikpen  ibuprofen (MOTRIN) 200 mg tablet Past History Past Medical History: 
Past Medical History:  
Diagnosis Date  Diabetes mellitus ([address] Past Surgical History: 
History reviewed. No pertinent surgical history. Family History: 
History reviewed. No pertinent family history. Social History: 
Social History Tobacco Use  Smoking status: Never Smoker  Smokeless tobacco: Never Used Substance Use Topics  Alcohol use: Not on file  Drug use: Not on file Allergies: 
No Known Allergies Review of Systems Review of Systems Unable to perform ROS: Acuity of condition Physical Exam  
Physical Exam  
Constitutional: She is oriented to person, place, and time. She appears distressed. Patient is a 69-year-old female who appears in severe distress. She is moaning and screaming and asking for water. She intermittently follows verbal commands and is moving all 4 extremities. Her mucous membranes are dry. HENT:  
Head: Normocephalic and atraumatic. Mouth/Throat: Oropharynx is clear and moist. No oropharyngeal exudate. Eyes: Pupils are equal, round, and reactive to light. Conjunctivae and EOM are normal.  
Neck: Normal range of motion. Cardiovascular: Regular rhythm. No murmur heard. Regular tachycardia with no murmurs rubs or gallops. Pulmonary/Chest: She is in respiratory distress. She has no wheezes. Patient is moderately tachypneic with respiratory rate of 28 breaths/min. No wheezing rales or rhonchi on exam.  Moderate accessory muscle use. Abdominal: She exhibits no distension. There is tenderness. Patient has mild tenderness to palpation in all 4 abdominal quadrants. No rebound or guarding. No abdominal distention. No diffuse signs of peritonitis. Musculoskeletal: Normal range of motion. She exhibits no edema or deformity. Neurological: She is alert and oriented to person, place, and time. Coordination normal.  
Skin: Skin is warm and dry. No rash noted. Psychiatric: She has a normal mood and affect. Her behavior is normal.  
Nursing note and vitals reviewed. Diagnostic Study Results Labs - Recent Results (from the past 24 hour(s)) POC LACTIC ACID Collection Time: 04/04/19 11:05 AM  
Result Value Ref Range Lactic Acid (POC) 3.11 (HH) 0.40 - 2.00 mmol/L  
CBC WITH AUTOMATED DIFF Collection Time: 04/04/19 11:06 AM  
Result Value Ref Range WBC 23.1 (H) 3.6 - 11.0 K/uL  
 RBC 5.19 3.80 - 5.20 M/uL  
 HGB 14.3 11.5 - 16.0 g/dL HCT 45.7 35.0 - 47.0 % MCV 88.1 80.0 - 99.0 FL  
 MCH 27.6 26.0 - 34.0 PG  
 MCHC 31.3 30.0 - 36.5 g/dL  
 RDW 12.8 11.5 - 14.5 % PLATELET 675 (H) 315 - 400 K/uL MPV 10.1 8.9 - 12.9 FL  
 NRBC 0.0 0  WBC ABSOLUTE NRBC 0.00 0.00 - 0.01 K/uL NEUTROPHILS 83 (H) 32 - 75 % LYMPHOCYTES 10 (L) 12 - 49 % MONOCYTES 5 5 - 13 % EOSINOPHILS 0 0 - 7 % BASOPHILS 1 0 - 1 % IMMATURE GRANULOCYTES 1 (H) 0.0 - 0.5 % ABS. NEUTROPHILS 19.2 (H) 1.8 - 8.0 K/UL  
 ABS. LYMPHOCYTES 2.4 0.8 - 3.5 K/UL  
 ABS. MONOCYTES 1.1 (H) 0.0 - 1.0 K/UL  
 ABS. EOSINOPHILS 0.0 0.0 - 0.4 K/UL  
 ABS. BASOPHILS 0.2 (H) 0.0 - 0.1 K/UL  
 ABS. IMM. GRANS. 0.2 (H) 0.00 - 0.04 K/UL  
 DF AUTOMATED    
HCG QL SERUM Collection Time: 04/04/19 11:06 AM  
Result Value Ref Range HCG, Ql. NEGATIVE  NEG    
TROPONIN I Collection Time: 04/04/19 11:06 AM  
Result Value Ref Range Troponin-I, Qt. <0.05 <0.05 ng/mL URINALYSIS W/ REFLEX CULTURE Collection Time: 04/04/19 11:06 AM  
Result Value Ref Range Color YELLOW/STRAW Appearance CLEAR CLEAR Specific gravity 1.023 1.003 - 1.030    
 pH (UA) 5.0 5.0 - 8.0 Protein 100 (A) NEG mg/dL Glucose >1,000 (A) NEG mg/dL Ketone 80 (A) NEG mg/dL Bilirubin NEGATIVE  NEG Blood SMALL (A) NEG Urobilinogen 0.2 0.2 - 1.0 EU/dL Nitrites NEGATIVE  NEG Leukocyte Esterase NEGATIVE  NEG    
 WBC 0-4 0 - 4 /hpf  
 RBC 0-5 0 - 5 /hpf Epithelial cells FEW FEW /lpf Bacteria NEGATIVE  NEG /hpf  
 UA:UC IF INDICATED CULTURE NOT INDICATED BY UA RESULT CNI Hyaline cast 0-2 0 - 5 /lpf METABOLIC PANEL, COMPREHENSIVE Collection Time: 04/04/19 11:06 AM  
Result Value Ref Range Sodium 135 (L) 136 - 145 mmol/L Potassium 5.4 (H) 3.5 - 5.1 mmol/L Chloride 105 97 - 108 mmol/L  
 CO2 5 (LL) 21 - 32 mmol/L  Anion gap 25 (H) 5 - 15 mmol/L  
 Glucose 808 (HH) 65 - 100 mg/dL BUN 23 (H) 6 - 20 MG/DL Creatinine 1.49 (H) 0.55 - 1.02 MG/DL  
 BUN/Creatinine ratio 15 12 - 20 GFR est AA 52 (L) >60 ml/min/1.73m2 GFR est non-AA 43 (L) >60 ml/min/1.73m2 Calcium 9.3 8.5 - 10.1 MG/DL Bilirubin, total 0.4 0.2 - 1.0 MG/DL  
 ALT (SGPT) 36 12 - 78 U/L  
 AST (SGOT) 17 15 - 37 U/L Alk. phosphatase 197 (H) 45 - 117 U/L Protein, total 8.4 (H) 6.4 - 8.2 g/dL Albumin 3.5 3.5 - 5.0 g/dL Globulin 4.9 (H) 2.0 - 4.0 g/dL A-G Ratio 0.7 (L) 1.1 - 2.2 GLUCOSE, POC Collection Time: 04/04/19 11:08 AM  
Result Value Ref Range Glucose (POC) >600 (HH) 65 - 100 mg/dL Performed by Carlee Bhandari (CON) GLUCOSE, POC Collection Time: 04/04/19 11:12 AM  
Result Value Ref Range Glucose (POC) >600 (HH) 65 - 100 mg/dL Performed by Carlee Bhandari (CON) POC CHEM8 Collection Time: 04/04/19 11:14 AM  
Result Value Ref Range Calcium, ionized (POC) 1.33 (H) 1.12 - 1.32 mmol/L Sodium (POC) 137 136 - 145 mmol/L Potassium (POC) 5.5 (H) 3.5 - 5.1 mmol/L Chloride (POC) 111 (H) 98 - 107 mmol/L  
 CO2 (POC) 6 (LL) 21 - 32 mmol/L Anion gap (POC) 26 (H) 10 - 20 mmol/L Glucose (POC) >700 (HH) 65 - 100 mg/dL BUN (POC) 21 (H) 9 - 20 mg/dL Creatinine (POC) 0.9 0.6 - 1.3 mg/dL GFRAA, POC >60 >60 ml/min/1.73m2 GFRNA, POC >60 >60 ml/min/1.73m2 Hematocrit (POC) 48 (H) 35.0 - 47.0 % Comment Notified RN or MD immediately by  POC VENOUS BLOOD GAS Collection Time: 04/04/19 11:29 AM  
Result Value Ref Range Device: ROOM AIR    
 pH, venous (POC) 6.826 (LL) 7.32 - 7.42    
 pCO2, venous (POC) 18.8 (L) 41 - 51 MMHG  
 pO2, venous (POC) 68 (H) 25 - 40 mmHg HCO3, venous (POC) 3.1 (L) 23.0 - 28.0 MMOL/L  
 sO2, venous (POC) 73 65 - 88 % Base deficit, venous (POC) <30 mmol/L Allens test (POC) N/A Total resp. rate 25 Site OTHER Specimen type (POC) VENOUS BLOOD EKG, 12 LEAD, INITIAL Collection Time: 04/04/19 12:22 PM  
Result Value Ref Range Ventricular Rate 117 BPM  
 Atrial Rate 117 BPM  
 P-R Interval 138 ms QRS Duration 88 ms Q-T Interval 372 ms QTC Calculation (Bezet) 518 ms Calculated P Axis 75 degrees Calculated R Axis 76 degrees Calculated T Axis 41 degrees Diagnosis Sinus tachycardia Biatrial enlargement Nonspecific ST abnormality No previous ECGs available ACETONE/KETONE, QL Collection Time: 04/04/19 12:37 PM  
Result Value Ref Range Acetone/Ketone serum, QL. LARGE (A) NEG       
GLUCOSTABILIZER Collection Time: 04/04/19  1:05 PM  
Result Value Ref Range Glucose 601 mg/dL Insulin order 10.8 units/hour Insulin adminstered 10.8 units/hour Multiplier 0.020 Low target 150 mg/dL High target 250 mg/dL D50 order 0.0 ml  
 D50 administered 0.00 ml Minutes until next BG 60 min Order initials arm Administered initials arm GLSCOM Comments Radiologic Studies -  
CT ABD PELV W CONT Final Result IMPRESSION:  
No acute intraperitoneal process is identified. There is a distended bladder. CT HEAD WO CONT Final Result IMPRESSION:  
  
No evidence for acute intracranial abnormality. Questionable prominent pituitary gland. Evaluation is slightly limited by streak  
and beam hardening artifact. Consider nonemergent MRI of the pituitary XR CHEST PORT Final Result IMPRESSION: No acute cardiopulmonary disease. CT Results  (Last 48 hours) 04/04/19 1346  CT ABD PELV W CONT Final result Impression:  IMPRESSION:  
No acute intraperitoneal process is identified. There is a distended bladder. Narrative:  EXAM: CT ABD PELV W CONT History: Abdominal pain, DKA INDICATION: Abdominal pain sepsis COMPARISON: None CONTRAST: 100 mL of Isovue-370.   
   
TECHNIQUE:   
 Following the uneventful intravenous administration of contrast, thin axial  
images were obtained through the abdomen and pelvis. Coronal and sagittal  
reconstructions were generated. Oral contrast was not administered. CT dose  
reduction was achieved through use of a standardized protocol tailored for this  
examination and automatic exposure control for dose modulation. FINDINGS:   
LUNG BASES: Clear. INCIDENTALLY IMAGED HEART AND MEDIASTINUM: Unremarkable. LIVER: No mass or biliary dilatation. GALLBLADDER: Unremarkable. SPLEEN: No mass. PANCREAS: No mass or ductal dilatation. ADRENALS: Unremarkable. KIDNEYS: No mass, calculus, or hydronephrosis. Mild hydroureter on the left. STOMACH: Unremarkable. SMALL BOWEL: No dilatation or wall thickening. COLON: No dilatation or wall thickening. APPENDIX: Unremarkable. PERITONEUM: No ascites or pneumoperitoneum. RETROPERITONEUM: No lymphadenopathy or aortic aneurysm. REPRODUCTIVE ORGANS:  
URINARY BLADDER: Distended BONES: No destructive bone lesion. ADDITIONAL COMMENTS: Emphysema in the anterior abdominal wall most likely  
related to injection sequelae 04/04/19 1204  CT HEAD WO CONT Final result Impression:  IMPRESSION:  
   
No evidence for acute intracranial abnormality. Questionable prominent pituitary gland. Evaluation is slightly limited by streak  
and beam hardening artifact. Consider nonemergent MRI of the pituitary Narrative:  INDICATION:   Confusion/delirium, altered LOC, unexplained EXAM:  HEAD CT WITHOUT CONTRAST  
   
COMPARISON:  None TECHNIQUE:  Routine noncontrast axial head CT was performed. Coronal and  
sagittal multiplanar reconstructions were obtained. CT dose reduction was  
achieved through use of a standardized protocol tailored for this examination  
and automatic exposure control for dose modulation.    
   
FINDINGS:  
   
 The ventricles and cortical sulci are within normal limits. No evidence for acute intracranial hemorrhage, midline shift, or mass effect. Gray-white matter differentiation is preserved. Questionable prominent pituitary  
gland. Evaluation is slightly limited by streak and beam hardening artifact. The basal cisterns are patent. The visualized paranasal sinuses and mastoid air cells are clear. No calvarial abnormality. CXR Results  (Last 48 hours) 04/04/19 1156  XR CHEST PORT Final result Impression:  IMPRESSION: No acute cardiopulmonary disease. Narrative:  INDICATION: DKA. Portable AP semiupright view of the chest.  
   
There is no prior study for direct comparison. Cardiomediastinal silhouette is within normal limits. Lungs are clear  
bilaterally. Pleural spaces are normal. Osseous structures are intact. Medical Decision Making I am the first provider for this patient. I reviewed the vital signs, available nursing notes, past medical history, past surgical history, family history and social history. Vital Signs-Reviewed the patient's vital signs. Patient Vitals for the past 24 hrs: 
 Temp Pulse Resp BP SpO2  
04/04/19 1330  (!) 116 24 122/76 100 % 04/04/19 1316  (!) 118 20  98 % 04/04/19 1315  (!) 116 24 (!) 131/99   
04/04/19 1301  (!) 115 26  100 % 04/04/19 1300  (!) 115 25 122/62   
04/04/19 1230  (!) 124 21 142/81 100 % 04/04/19 1132  (!) 120 29  100 % 04/04/19 1130  (!) 120 25 129/75   
04/04/19 1115  (!) 121 26 133/71 100 % 04/04/19 1057 98 °F (36.7 °C) (!) 124 30 137/80 100 % Pulse Oximetry Analysis -96 % on room air Cardiac Monitor:  
Rate: 130 bpm 
Rhythm: Sinus Tachycardia EKG: Sinus tachycardia with a rate of 117. Nonspecific ST segment changes on the inferior lateral leads. No STEMI. Biatrial enlargement. Records Reviewed: Nursing Notes Differential Diagnosis: 
 
Patient presenting with altered mental status. Pt has stable vitals and POC glucose was checked immediately upon arrival. DDx: medication toxicity, infection, anemia, electrolyte/metabolic anomoly, hypercapnea, stroke/bleed/mass, dehydration, illicit drug intoxication. Will obtain labwork, UA, EKG and CT imaging of the head, chest xray. Will consider adding toxicologic workup if history unclear or warrants further investigation of toxic source. Will continue to monitor and reassess for admission. Provider Notes (Medical Decision Making):  
 
Patient with findings of severe DKA and severe acidosis on presentation. Patient's potassium is 5.6. Given 2 L normal saline bolus, started on insulin drip, and given 1 amp of sodium bicarb given the severe acidosis. CT head showed no intracranial hemorrhage. Noted leukocytosis but no evidence of infection from urinalysis or chest x-ray. Will obtain CT to rule out intra-abdominal pathology. Of note, patient is requesting to be given the \"trash\". Unclear if there is underlying substance abuse possible withdrawal symptoms. UDS added. ED Course:  
 
Initial assessment performed. The patients presenting problems have been discussed, and they are in agreement with the care plan formulated and outlined with them. I have encouraged them to ask questions as they arise throughout their visit. ED Course as of Apr 04 1433 Thu Apr 04, 2019  
1231 Reexamine patient. Patient still tachycardic pain and responding to verbal stimuli. She is protecting her airway and her oxygen saturations are 100%. I initiating IV fluids, insulin drip, and 1 amp of sodium bicarb. Blood cultures were sent and broad-spectrum antibiotics were given. I suspect patient's leukocytosis and lactic acidosis is secondary to diabetic ketoacidosis. However, covered for urinary tract infection as well. [CC] ED Course User Index [CC] Samantha Cross MD  
 
 
Critical Care Time: CRITICAL CARE NOTE : 
 
2:32 PM 
 
 
IMPENDING DETERIORATION -Airway and Cardiovascular ASSOCIATED RISK FACTORS - Hypotension, Shock, Hypoxia, Metabolic changes, Dehydration and CNS Decompensation MANAGEMENT- Bedside Assessment and Supervision of Care INTERPRETATION -  Xrays, CT Scan, Blood Gases, ECG and Blood Pressure INTERVENTIONS - hemodynamic mngmt and Metobolic interventions CASE REVIEW - Hospitalist 
 
TREATMENT RESPONSE -Stable PERFORMED BY - Self NOTES   : 
 
 
I have spent 35 minutes of critical care time involved in lab review, consultations with specialist, family decision- making, bedside attention and documentation. During this entire length of time I was immediately available to the patient . Tono Hadley MD 
 
 
Disposition: 
Admit Note: 
2:33 PM 
Pt is being admitted by hospitalist. The results of their tests and reason(s) for their admission have been discussed with pt and/or available family. They convey agreement and understanding for the need to be admitted and for admission diagnosis. PLAN: 
1. Current Discharge Medication List  
  
 
2. Follow-up Information None Return to ED if worse Diagnosis Clinical Impression: 1. Diabetic ketoacidosis with coma associated with other specified diabetes mellitus ([facility] [address] 2. Metabolic encephalopathy

## 2019-04-04 NOTE — PROGRESS NOTES
4/4/2019 INTENSIVIST PROGRESS NOTE:  
 
Patient seen and evaluated, chart reviewed 23 yo female presented with abdominal pain, polydipsia and polyuria Found to have DKA Started on insulin drip, IVF resuscitation Now pt in CCU in no severe distress ROS: + abdominal pain, thirsty, frequent urination Visit Vitals /77 Pulse (!) 123 Temp (!) 93.2 °F (34 °C) Resp 23 Ht 5' 5\" (1.651 m) Wt 67.4 kg (148 lb 9.4 oz) SpO2 100% BMI 24.73 kg/m² General: no severe distress Eyes: anicteric HEENT: dry oral mucosa Neck: FROM 
CV: RRR Lungs: clear Abd: soft : no flank pain Ext: no edema Skin: no rash Musculoskeletal: normal inspection Neuro: non focal 
 
 
CXR: clear Labs reviewed A/P: 
- DKA: this is not lactic acidosis, this ketoacidosis, no lactic acid documented in her labs, needs insulin drip, IVF resuscitation 
- will give bicarb now - IV abx started by hospitalist? 
- no evidence of obvious source of infection - ANGIE: IVF 
- monitor electrolytes 
- NPO for now - CCU monitoring overnight - Will assist on disposition planning when stable for transfer Kwasi Estrada MD

## 2019-04-05 LAB
ADMINISTERED INITIALS, ADMINIT: NORMAL
ANION GAP SERPL CALC-SCNC: 7 MMOL/L (ref 5–15)
ANION GAP SERPL CALC-SCNC: 8 MMOL/L (ref 5–15)
ANION GAP SERPL CALC-SCNC: 8 MMOL/L (ref 5–15)
BASOPHILS # BLD: 0 K/UL (ref 0–0.1)
BASOPHILS NFR BLD: 0 % (ref 0–1)
BUN SERPL-MCNC: 12 MG/DL (ref 6–20)
BUN SERPL-MCNC: 13 MG/DL (ref 6–20)
BUN SERPL-MCNC: 15 MG/DL (ref 6–20)
BUN/CREAT SERPL: 13 (ref 12–20)
BUN/CREAT SERPL: 15 (ref 12–20)
BUN/CREAT SERPL: 16 (ref 12–20)
CALCIUM SERPL-MCNC: 8 MG/DL (ref 8.5–10.1)
CALCIUM SERPL-MCNC: 8 MG/DL (ref 8.5–10.1)
CALCIUM SERPL-MCNC: 8.4 MG/DL (ref 8.5–10.1)
CHLORIDE SERPL-SCNC: 116 MMOL/L (ref 97–108)
CO2 SERPL-SCNC: 17 MMOL/L (ref 21–32)
CO2 SERPL-SCNC: 19 MMOL/L (ref 21–32)
CO2 SERPL-SCNC: 19 MMOL/L (ref 21–32)
COMMENT, HOLDF: NORMAL
CREAT SERPL-MCNC: 0.87 MG/DL (ref 0.55–1.02)
CREAT SERPL-MCNC: 0.92 MG/DL (ref 0.55–1.02)
CREAT SERPL-MCNC: 0.92 MG/DL (ref 0.55–1.02)
D50 ADMINISTERED, D50ADM: 0 ML
D50 ORDER, D50ORD: 0 ML
DIFFERENTIAL METHOD BLD: ABNORMAL
EOSINOPHIL # BLD: 0 K/UL (ref 0–0.4)
EOSINOPHIL NFR BLD: 0 % (ref 0–7)
ERYTHROCYTE [DISTWIDTH] IN BLOOD BY AUTOMATED COUNT: 12.9 % (ref 11.5–14.5)
EST. AVERAGE GLUCOSE BLD GHB EST-MCNC: ABNORMAL MG/DL
GLSCOM COMMENTS: NORMAL
GLUCOSE BLD STRIP.AUTO-MCNC: 102 MG/DL (ref 65–100)
GLUCOSE BLD STRIP.AUTO-MCNC: 103 MG/DL (ref 65–100)
GLUCOSE BLD STRIP.AUTO-MCNC: 107 MG/DL (ref 65–100)
GLUCOSE BLD STRIP.AUTO-MCNC: 116 MG/DL (ref 65–100)
GLUCOSE BLD STRIP.AUTO-MCNC: 118 MG/DL (ref 65–100)
GLUCOSE BLD STRIP.AUTO-MCNC: 138 MG/DL (ref 65–100)
GLUCOSE BLD STRIP.AUTO-MCNC: 141 MG/DL (ref 65–100)
GLUCOSE BLD STRIP.AUTO-MCNC: 144 MG/DL (ref 65–100)
GLUCOSE BLD STRIP.AUTO-MCNC: 147 MG/DL (ref 65–100)
GLUCOSE BLD STRIP.AUTO-MCNC: 147 MG/DL (ref 65–100)
GLUCOSE BLD STRIP.AUTO-MCNC: 154 MG/DL (ref 65–100)
GLUCOSE BLD STRIP.AUTO-MCNC: 168 MG/DL (ref 65–100)
GLUCOSE BLD STRIP.AUTO-MCNC: 66 MG/DL (ref 65–100)
GLUCOSE BLD STRIP.AUTO-MCNC: 76 MG/DL (ref 65–100)
GLUCOSE BLD STRIP.AUTO-MCNC: 80 MG/DL (ref 65–100)
GLUCOSE BLD STRIP.AUTO-MCNC: 84 MG/DL (ref 65–100)
GLUCOSE SERPL-MCNC: 107 MG/DL (ref 65–100)
GLUCOSE SERPL-MCNC: 119 MG/DL (ref 65–100)
GLUCOSE SERPL-MCNC: 142 MG/DL (ref 65–100)
GLUCOSE, GLC: 102 MG/DL
GLUCOSE, GLC: 107 MG/DL
GLUCOSE, GLC: 116 MG/DL
GLUCOSE, GLC: 118 MG/DL
GLUCOSE, GLC: 138 MG/DL
GLUCOSE, GLC: 141 MG/DL
GLUCOSE, GLC: 144 MG/DL
GLUCOSE, GLC: 147 MG/DL
GLUCOSE, GLC: 147 MG/DL
GLUCOSE, GLC: 154 MG/DL
HBA1C MFR BLD: 15.3 % (ref 4.2–6.3)
HCT VFR BLD AUTO: 34.1 % (ref 35–47)
HGB BLD-MCNC: 11.6 G/DL (ref 11.5–16)
HIGH TARGET, HITG: 250 MG/DL
IMM GRANULOCYTES # BLD AUTO: 0.1 K/UL (ref 0–0.04)
IMM GRANULOCYTES NFR BLD AUTO: 1 % (ref 0–0.5)
INSULIN ADMINSTERED, INSADM: 0 UNITS/HOUR
INSULIN ADMINSTERED, INSADM: 0.1 UNITS/HOUR
INSULIN ADMINSTERED, INSADM: 0.8 UNITS/HOUR
INSULIN ORDER, INSORD: 0 UNITS/HOUR
INSULIN ORDER, INSORD: 0.1 UNITS/HOUR
INSULIN ORDER, INSORD: 0.8 UNITS/HOUR
LACTATE SERPL-SCNC: 1 MMOL/L (ref 0.4–2)
LOW TARGET, LOT: 150 MG/DL
LYMPHOCYTES # BLD: 2.6 K/UL (ref 0.8–3.5)
LYMPHOCYTES NFR BLD: 17 % (ref 12–49)
MAGNESIUM SERPL-MCNC: 2.2 MG/DL (ref 1.6–2.4)
MCH RBC QN AUTO: 27.6 PG (ref 26–34)
MCHC RBC AUTO-ENTMCNC: 34 G/DL (ref 30–36.5)
MCV RBC AUTO: 81 FL (ref 80–99)
MINUTES UNTIL NEXT BG, NBG: 60 MIN
MONOCYTES # BLD: 1.5 K/UL (ref 0–1)
MONOCYTES NFR BLD: 10 % (ref 5–13)
MULTIPLIER, MUL: 0
MULTIPLIER, MUL: 0.01
NEUTS SEG # BLD: 11.1 K/UL (ref 1.8–8)
NEUTS SEG NFR BLD: 73 % (ref 32–75)
NRBC # BLD: 0 K/UL (ref 0–0.01)
NRBC BLD-RTO: 0 PER 100 WBC
ORDER INITIALS, ORDINIT: NORMAL
PHOSPHATE SERPL-MCNC: 1.8 MG/DL (ref 2.6–4.7)
PHOSPHATE SERPL-MCNC: 2 MG/DL (ref 2.6–4.7)
PHOSPHATE SERPL-MCNC: 2.1 MG/DL (ref 2.6–4.7)
PLATELET # BLD AUTO: 279 K/UL (ref 150–400)
PMV BLD AUTO: 9.3 FL (ref 8.9–12.9)
POTASSIUM SERPL-SCNC: 3.1 MMOL/L (ref 3.5–5.1)
POTASSIUM SERPL-SCNC: 3.3 MMOL/L (ref 3.5–5.1)
POTASSIUM SERPL-SCNC: 3.4 MMOL/L (ref 3.5–5.1)
RBC # BLD AUTO: 4.21 M/UL (ref 3.8–5.2)
SAMPLES BEING HELD,HOLD: NORMAL
SERVICE CMNT-IMP: ABNORMAL
SERVICE CMNT-IMP: NORMAL
SODIUM SERPL-SCNC: 141 MMOL/L (ref 136–145)
SODIUM SERPL-SCNC: 142 MMOL/L (ref 136–145)
SODIUM SERPL-SCNC: 143 MMOL/L (ref 136–145)
WBC # BLD AUTO: 15.3 K/UL (ref 3.6–11)

## 2019-04-05 PROCEDURE — 36415 COLL VENOUS BLD VENIPUNCTURE: CPT

## 2019-04-05 PROCEDURE — 74011636637 HC RX REV CODE- 636/637: Performed by: HOSPITALIST

## 2019-04-05 PROCEDURE — 83735 ASSAY OF MAGNESIUM: CPT

## 2019-04-05 PROCEDURE — 74011250636 HC RX REV CODE- 250/636: Performed by: INTERNAL MEDICINE

## 2019-04-05 PROCEDURE — 74011250636 HC RX REV CODE- 250/636: Performed by: HOSPITALIST

## 2019-04-05 PROCEDURE — 82962 GLUCOSE BLOOD TEST: CPT

## 2019-04-05 PROCEDURE — 83036 HEMOGLOBIN GLYCOSYLATED A1C: CPT

## 2019-04-05 PROCEDURE — 65270000015 HC RM PRIVATE ONCOLOGY

## 2019-04-05 PROCEDURE — 74011000258 HC RX REV CODE- 258: Performed by: INTERNAL MEDICINE

## 2019-04-05 PROCEDURE — 84100 ASSAY OF PHOSPHORUS: CPT

## 2019-04-05 PROCEDURE — 80048 BASIC METABOLIC PNL TOTAL CA: CPT

## 2019-04-05 PROCEDURE — 74011250637 HC RX REV CODE- 250/637: Performed by: HOSPITALIST

## 2019-04-05 PROCEDURE — 83605 ASSAY OF LACTIC ACID: CPT

## 2019-04-05 PROCEDURE — 85025 COMPLETE CBC W/AUTO DIFF WBC: CPT

## 2019-04-05 RX ORDER — INSULIN GLARGINE 100 [IU]/ML
22 INJECTION, SOLUTION SUBCUTANEOUS DAILY
Status: DISCONTINUED | OUTPATIENT
Start: 2019-04-05 | End: 2019-04-06

## 2019-04-05 RX ORDER — MAGNESIUM SULFATE 100 %
4 CRYSTALS MISCELLANEOUS AS NEEDED
Status: DISCONTINUED | OUTPATIENT
Start: 2019-04-05 | End: 2019-04-08 | Stop reason: HOSPADM

## 2019-04-05 RX ORDER — DEXTROSE 50 % IN WATER (D50W) INTRAVENOUS SYRINGE
12.5-25 AS NEEDED
Status: DISCONTINUED | OUTPATIENT
Start: 2019-04-05 | End: 2019-04-08 | Stop reason: HOSPADM

## 2019-04-05 RX ORDER — INSULIN GLARGINE 100 [IU]/ML
28 INJECTION, SOLUTION SUBCUTANEOUS
COMMUNITY

## 2019-04-05 RX ORDER — SODIUM CHLORIDE 9 MG/ML
125 INJECTION, SOLUTION INTRAVENOUS CONTINUOUS
Status: DISCONTINUED | OUTPATIENT
Start: 2019-04-05 | End: 2019-04-06

## 2019-04-05 RX ORDER — DEXTROSE, SODIUM CHLORIDE, AND POTASSIUM CHLORIDE 5; .45; .15 G/100ML; G/100ML; G/100ML
125 INJECTION INTRAVENOUS CONTINUOUS
Status: DISCONTINUED | OUTPATIENT
Start: 2019-04-05 | End: 2019-04-05

## 2019-04-05 RX ORDER — INSULIN LISPRO 100 [IU]/ML
INJECTION, SOLUTION INTRAVENOUS; SUBCUTANEOUS
Status: DISCONTINUED | OUTPATIENT
Start: 2019-04-05 | End: 2019-04-08 | Stop reason: HOSPADM

## 2019-04-05 RX ORDER — INSULIN LISPRO 100 [IU]/ML
4 INJECTION, SOLUTION INTRAVENOUS; SUBCUTANEOUS
Status: DISCONTINUED | OUTPATIENT
Start: 2019-04-05 | End: 2019-04-05

## 2019-04-05 RX ORDER — METFORMIN HYDROCHLORIDE 500 MG/1
500 TABLET ORAL 2 TIMES DAILY WITH MEALS
Status: DISCONTINUED | OUTPATIENT
Start: 2019-04-06 | End: 2019-04-05

## 2019-04-05 RX ORDER — FAMOTIDINE 20 MG/1
20 TABLET, FILM COATED ORAL 2 TIMES DAILY
Status: DISCONTINUED | OUTPATIENT
Start: 2019-04-05 | End: 2019-04-08 | Stop reason: HOSPADM

## 2019-04-05 RX ADMIN — HEPARIN SODIUM 5000 UNITS: 5000 INJECTION INTRAVENOUS; SUBCUTANEOUS at 21:28

## 2019-04-05 RX ADMIN — HEPARIN SODIUM 5000 UNITS: 5000 INJECTION INTRAVENOUS; SUBCUTANEOUS at 12:47

## 2019-04-05 RX ADMIN — FAMOTIDINE 20 MG: 20 TABLET ORAL at 17:21

## 2019-04-05 RX ADMIN — DEXTROSE MONOHYDRATE, SODIUM CHLORIDE, AND POTASSIUM CHLORIDE 125 ML/HR: 50; 4.5; 1.49 INJECTION, SOLUTION INTRAVENOUS at 01:10

## 2019-04-05 RX ADMIN — HYDROCODONE BITARTRATE AND ACETAMINOPHEN 1 TABLET: 5; 325 TABLET ORAL at 01:09

## 2019-04-05 RX ADMIN — Medication 10 ML: at 21:29

## 2019-04-05 RX ADMIN — PIPERACILLIN AND TAZOBACTAM 3.38 G: 3; .375 INJECTION, POWDER, FOR SOLUTION INTRAVENOUS at 02:51

## 2019-04-05 RX ADMIN — SODIUM CHLORIDE 125 ML/HR: 900 INJECTION, SOLUTION INTRAVENOUS at 11:27

## 2019-04-05 RX ADMIN — INSULIN GLARGINE 22 UNITS: 100 INJECTION, SOLUTION SUBCUTANEOUS at 11:27

## 2019-04-05 RX ADMIN — MUPIROCIN: 20 OINTMENT TOPICAL at 09:03

## 2019-04-05 RX ADMIN — Medication 10 ML: at 15:14

## 2019-04-05 RX ADMIN — FAMOTIDINE 20 MG: 10 INJECTION, SOLUTION INTRAVENOUS at 09:03

## 2019-04-05 RX ADMIN — SODIUM CHLORIDE 125 ML/HR: 900 INJECTION, SOLUTION INTRAVENOUS at 20:49

## 2019-04-05 RX ADMIN — Medication 10 ML: at 05:16

## 2019-04-05 RX ADMIN — HEPARIN SODIUM 5000 UNITS: 5000 INJECTION INTRAVENOUS; SUBCUTANEOUS at 05:16

## 2019-04-05 RX ADMIN — HYDROCODONE BITARTRATE AND ACETAMINOPHEN 1 TABLET: 5; 325 TABLET ORAL at 12:47

## 2019-04-05 RX ADMIN — HYDROCODONE BITARTRATE AND ACETAMINOPHEN 1 TABLET: 5; 325 TABLET ORAL at 20:49

## 2019-04-05 RX ADMIN — MORPHINE SULFATE 1 MG: 2 INJECTION, SOLUTION INTRAMUSCULAR; INTRAVENOUS at 04:26

## 2019-04-05 RX ADMIN — MORPHINE SULFATE 1 MG: 2 INJECTION, SOLUTION INTRAMUSCULAR; INTRAVENOUS at 09:04

## 2019-04-05 NOTE — PROGRESS NOTES
Hospitalist Progress Note NAME: Claire Smart :  1995 MRN:  [id number] Assessment / Plan: 
 
Diabetic ketoacidosis POA Anion gap metabolic acidosis due to above Diabetes mellitus uncontrolled with hyperglycemia Pseudohyponatremia, resolved Metabolic encephalopathy, resolved 
-admitted per DKA protocol 
-anion closed, BS controlled, Hba1c 15.3, bicarb improving 
-will wean off insulin gtt and start SQ lantus 
-change fluids to NS Acute kidney injury, most likely prerenal 
-Creatinine 1.45->0.92, resolved with IV fluids SIRS-non infectious with acute organ dysfunction ( ANGIE) 
likely secondary to DKA, POA Leukocytosis likely reactive due to above Tachycardic, likely secondary to multiple metabolic derangements and volume depletion LA 2.4->1.0, wbc down to 15 from 23 
-CT abd/pelvis neg for acute pathology 
-UA neg for infection 
-CXR neg for acute process 
-blood cx -no growth for 20 hours 
-will d/c zosyn Hypokalemia 
-replace Hypophosphatemia 
-improved with resolution of DKA GERD, pt says she has H/O GERD but not on any meds 
-initiated on famotidine on admission, cont Hgb drop from 14 to 11.6 
-likely she was hemo-concentrated on admission, no s/s of bleed from anywhere, will monitor Concern for non compliance-Hba1c 15.3 
-Counseled regarding compliance Body mass index is 24.73 kg/m². therefore classifying patient as normal wt Code Status: Full Code Surrogate decision maker: Mother -Saul Mcdonald DVT Prophylaxis: Lovenox GI Prophylaxis: not indicated Subjective: Chief Complaint / Reason for Physician Visit \"I am doing better\". Discussed with RN events overnight. Review of Systems: 
Symptom Y/N Comments  Symptom Y/N Comments Fever/Chills n   Chest Pain n   
Poor Appetite y   Edema n   
Cough n   Abdominal Pain y Sputum n   Joint Pain SOB/IVY n   Pruritis/Rash n   
Nausea/vomit y   Tolerating PT/OT Diarrhea n   Tolerating Diet Constipation    Other Could NOT obtain due to:   
 
Objective: VITALS:  
Last 24hrs VS reviewed since prior progress note. Most recent are: 
Patient Vitals for the past 24 hrs: 
 Temp Pulse Resp BP SpO2  
04/05/19 0913  (!) 106 18  100 % 04/05/19 0900  (!) 111 17 131/90 100 % 04/05/19 0830  (!) 109 17  100 % 04/05/19 0800  (!) 107 23 120/79 100 % 04/05/19 0738 98.3 °F (36.8 °C)      
04/05/19 0700  (!) 109 21 119/77 100 % 04/05/19 0600  (!) 109 21 109/62 100 % 04/05/19 0500  (!) 109 19 132/70 99 % 04/05/19 0400 98.3 °F (36.8 °C) (!) 112 16 115/66 99 % 04/05/19 0300  (!) 113 17 104/57 99 % 04/05/19 0200  (!) 119 19 120/71 100 % 04/05/19 0100  (!) 116 20 124/76 100 % 04/04/19 2300 99.5 °F (37.5 °C) (!) 119 23 112/61 100 % 04/04/19 2000 98 °F (36.7 °C) (!) 127 22 125/72 100 % 04/04/19 1800 95.2 °F (35.1 °C) (!) 114 22 128/79 100 % 04/04/19 1705  (!) 117 23  94 % 04/04/19 1700  (!) 118 (!) 42 111/66   
04/04/19 1611 (!) 93.2 °F (34 °C) (!) 123 23 124/77 100 % 04/04/19 1603  (!) 126 24    
04/04/19 1330  (!) 116 24 122/76 100 % 04/04/19 1316  (!) 118 20  98 % 04/04/19 1315  (!) 116 24 (!) 131/99   
04/04/19 1301  (!) 115 26  100 % 04/04/19 1300  (!) 115 25 122/62   
04/04/19 1230  (!) 124 21 142/81 100 % 04/04/19 1132  (!) 120 29  100 % 04/04/19 1130  (!) 120 25 129/75   
04/04/19 1115  (!) 121 26 133/71 100 % 04/04/19 1057 98 °F (36.7 °C) (!) 124 30 137/80 100 % Intake/Output Summary (Last 24 hours) at 4/5/2019 1003 Last data filed at 4/5/2019 7865 Gross per 24 hour Intake 1092.05 ml Output 2400 ml Net -1307.95 ml PHYSICAL EXAM: 
General: WD, WN. Alert, cooperative, no acute distress   
EENT:  EOMI. Anicteric sclerae. MMM Resp:  CTA bilaterally, no wheezing or rales. No accessory muscle use CV:  Regular  rhythm,  No edema GI:  Generalized abdominal tenderness, Soft, Non distended, no rebound or rigidity,  +Bowel sounds Neurologic:  Alert and oriented X 3, normal speech, CN 2-12 grossly intact Psych:   Not anxious nor agitated Skin:  No rashes. No jaundice Reviewed most current lab test results and cultures  YES Reviewed most current radiology test results   YES Review and summation of old records today    NO Reviewed patient's current orders and MAR    YES 
PMH/SH reviewed - no change compared to H&P 
________________________________________________________________________ Care Plan discussed with: 
  Comments Patient x Family RN x Care Manager Consultant Multidiciplinary team rounds were held today with , nursing, pharmacist and clinical coordinator. Patient's plan of care was discussed; medications were reviewed and discharge planning was addressed. ________________________________________________________________________ Total NON critical care TIME: 35  Minutes Total CRITICAL CARE TIME Spent:   Minutes non procedure based Comments >50% of visit spent in counseling and coordination of care    
________________________________________________________________________ Stuart Oliver MD  
 
Procedures: see electronic medical records for all procedures/Xrays and details which were not copied into this note but were reviewed prior to creation of Plan. LABS: 
I reviewed today's most current labs and imaging studies. Pertinent labs include: 
Recent Labs 04/05/19 0222 04/04/19 
1106 WBC 15.3* 23.1* HGB 11.6 14.3 HCT 34.1* 45.7  512* Recent Labs 04/05/19 
0846 04/05/19 
0550 04/05/19 
0145  04/04/19 
1106  143 141   < > 135* K 3.4* 3.1* 3.3*   < > 5.4*  
* 116* 116*   < > 105 CO2 19* 19* 17*   < > 5* * 119* 142*   < > 808* BUN 12 13 15   < > 23* CREA 0.92 0.87 0.92   < > 1.49* CA 8.4* 8.0* 8.0*   < > 9.3 MG 2.2 2.2 2.2   < >  --   
PHOS 2.1* 2.0* 1.8*   < >  --   
ALB  --   --   --   --  3.5 TBILI  --   --   --   --  0.4 SGOT  --   --   --   --  17 ALT  --   --   --   --  36  
 < > = values in this interval not displayed.   
 
 
Signed: Kaden Montanez MD

## 2019-04-05 NOTE — PROGRESS NOTES
Oncology End of Shift Note Bedside shift change report given to [facility], RN (incoming nurse) by Gaurav England (outgoing nurse) on Sandrita Ordonez. Report included the following information SBAR, Kardex, ED Summary, Intake/Output, MAR, Accordion and Recent Results. Shift Summary: Patient remained stable Issues for Physician to Address:   
 
Patient on Cardiac Monitoring? [] Yes 
[x] No 
 
Rhythm:   
 
 
 
Shift Events Gaurav England

## 2019-04-05 NOTE — PROGRESS NOTES
4/5/2019 INTENSIVIST PROGRESS NOTE:  
 
Patient seen and evaluated, chart reviewed 25 yo female presented with abdominal pain, polydipsia and polyuria Found to have DKA Started on insulin drip, IVF resuscitation Now pt in CCU in no severe distress ROS: + abdominal pain, thirsty, frequent urination Visit Vitals /90 Pulse (!) 106 Temp 98.3 °F (36.8 °C) Resp 18 Ht 5' 5\" (1.651 m) Wt 67.4 kg (148 lb 9.4 oz) SpO2 100% BMI 24.73 kg/m² General: no severe distress Eyes: anicteric HEENT: dry oral mucosa Neck: FROM 
CV: RRR Lungs: clear Abd: soft : no flank pain Ext: no edema Skin: no rash Musculoskeletal: normal inspection Neuro: non focal 
 
 
 
Labs: 
 
Recent Labs 04/05/19 
0222 04/04/19 
1106 WBC 15.3* 23.1* HGB 11.6 14.3  512* Recent Labs 04/05/19 
7983 04/05/19 
0550 04/05/19 
0145 04/04/19 
2223  04/04/19 
1628 04/04/19 
1106  143 141  --    < > 145 135* K 3.4* 3.1* 3.3*  --    < > 4.1 5.4*  
* 116* 116*  --    < > 118* 105 CO2 19* 19* 17*  --    < > 5* 5* * 119* 142*  --    < > 505* 808* BUN 12 13 15  --    < > 22* 23* CREA 0.92 0.87 0.92  --    < > 1.18* 1.49* CA 8.4* 8.0* 8.0*  --    < > 8.2* 9.3 MG 2.2 2.2 2.2  --    < > 2.7*  --   
PHOS 2.1* 2.0* 1.8*  --    < > 4.2  --   
LAC 1.0  --   --  1.2  --  2.4*  --   
ALB  --   --   --   --   --   --  3.5 SGOT  --   --   --   --   --   --  17 ALT  --   --   --   --   --   --  36  
 < > = values in this interval not displayed. No results for input(s): PH, PCO2, PO2, HCO3, FIO2 in the last 72 hours. Recent Labs 04/04/19 
1106 TROIQ <0.05 No results found for: BNPP, BNP  
 
 
CXR Results  (Last 48 hours) 04/04/19 1156  XR CHEST PORT Final result Impression:  IMPRESSION: No acute cardiopulmonary disease. Narrative:  INDICATION: DKA.    
   
Portable AP semiupright view of the chest.  
   
 There is no prior study for direct comparison. Cardiomediastinal silhouette is within normal limits. Lungs are clear  
bilaterally. Pleural spaces are normal. Osseous structures are intact. A/P: 
- DKA: 
- requested records from [address] [place] [address] in [place], [place] akanksha she said she was admitted two weeks ago 
- no need for IV abx - no evidence of obvious source of infection 
-abdominal pain- normal exam an d Abdomial CT 
- ANGIE: IVF 
- monitor electrolytes - Diet as tolerated - DTC consult 
- may transfer to floor - Will assist on disposition planning when stable for transfer Keely Ireland MD

## 2019-04-05 NOTE — PROGRESS NOTES
Critical care interdisciplinary rounds held on 04/05/2019. Following members present, Pharmacy, Diabetes Treatment, Case Management, Respiratory Therapy and Nutrition. Led by Denis Olivarez RN and Dr. Ximena Pearson. Plan of care discussed. See clinical pathway for plan of care and interventions and desired outcomes.

## 2019-04-05 NOTE — PROGRESS NOTES
0300 Bedside and Verbal shift change report given to Luther Patiño (oncoming nurse) by Cedrick Jones RN (offgoing nurse). Report included the following information SBAR, Kardex, ED Summary, Intake/Output, MAR, Recent Results and Cardiac Rhythm ST.  
 
0321  - per glucostabilizer insulin gtt held at this time 0400 Shift assessment complete - see flowsheet for details 0421  - per glucostabilizer insulin gtt held at this time 0426 PRN Morphine 1mg given for abdominal pain 10/10 
 
0520  - per glucostabilizer insulin gtt held at this time; patient still c/o abdominal pain 0550 Labs drawn & sent 
 
0624  
 
0627 Insulin gtt restarted at 0.1 units/hr 
 
0640 Labs resulted - anion gap 8 this is the second consecutive normal value for the serial BMP's; K 3.1  
 
0700 Bedside and Verbal shift change report given to Soniya Neves RN (oncoming nurse) by Ashwin Collier RN (offgoing nurse). Report included the following information SBAR, Kardex, ED Summary, Intake/Output, MAR, Recent Results and Cardiac Rhythm ST.

## 2019-04-05 NOTE — DIABETES MGMT
DTC Consult Note Recommendations/ Comments: Insulin gtt rate at zero over night. Pt reviewed in CCU rounds with rounding team and Dr. Ximena Pearson. Plan to stop glucostabilizer and D5 IVF, administer lantus which was ordered already by hospitalist.  Plan to begin CL diet and adv as ángel, humalog 4 units ac tid. Recommend titrating lantus as needed for elevated fasting BG and titrating prandial humalog as needed for elevated daytime BG with po intake. Current hospital DM medication: insulin gtt Met with pt. Pt initially sitting up in bed then laid down with eyes closed. Pt would not speak loudly enough to hear well. Had to ask pt to repeat answers to questions. Minimal eye contact. Tearful with questions. Pt is poor historian and her information changes with each person she speaks with. Per nurse pt reported being on NPH. Per chart pt reported missing insulin for last 3-4 days. Pt reports she is from [place]. She plans to return to [place]. She was vague on who follows her for her DM, however, she states she has a new endo appt at the end of this month. She denies illness PTA. She denies missing any insulin doses ever. When questioned about her a1c of 15.3%, she states her a1c is always high. She reports she was switched from lantus/ humalog to 70/30 for several months and was changed back to lantus/humalog 2 weeks ago. Asked several times about missing insulin doses and she denies each time. Discussed importance of lantus daily. Pt reports she is comfortable with CHO counting. Left written materials and contact number at bedside as at this time pt laid down in bed with eyes closed, not engaged in conversation. Consult received for:  [x]             Assessment of home management 
              []      Medication Recommendations []             Meter/monitoring 
   []             Insulin instruction []             New diagnosis []             Outpatient education []             Insulin pump patient [x]             Insulin infusion 
   [x]             DKA/HHS Chart reviewed and initial evaluation complete on Cristal Dumont. Patient is a 24 y.o. female with known Type 1 Diabetes since age 5 on lantus 28 units nightly, humalog 1 unit:5 gm CHO plus correction scale (possibly over 170 starting at 1 unit- pt cannot recall scale) at home. Assessed and instructed patient on the following:  
·  interpretation of lab results, hypoglycemia prevention and treatment and referred to Diabetes Educator Provided patient with the following: [x]             Self care guide [x]             Insulin education materials []             CHO counting education materials [x]             Outpatient DTC contact number 
             []             Glucometer Patient was able to give return demonstration of 
  []       Glucometer 
  []       saline injection  
   with []     without []       assistance needed. []       Nurse to have patient self inject prior to discharge. Discussed with patient and/or family need for follow up appointment for diabetes management after discharge. A1c:  
Lab Results Component Value Date/Time Hemoglobin A1c 15.3 (H) 04/05/2019 02:22 AM  
 
 
Recent Glucose Results:  
Lab Results Component Value Date/Time  (H) 04/05/2019 08:46 AM  
  (H) 04/05/2019 05:50 AM  
  (H) 04/05/2019 01:45 AM  
 GLUCPOC 107 (H) 04/05/2019 09:45 AM  
 GLUCPOC 102 (H) 04/05/2019 08:40 AM  
 GLUCPOC 118 (H) 04/05/2019 07:35 AM  
  
 
Lab Results Component Value Date/Time Creatinine 0.92 04/05/2019 08:46 AM  
 
Estimated Creatinine Clearance: 84.8 mL/min (based on SCr of 0.92 mg/dL). Active Orders There are no active orders of the following types: Diet. PO intake: No data found. Will continue to follow as needed. Thank you. MAC AmesN, RN, CDE [facility] Time spent: 25 min

## 2019-04-05 NOTE — PROGRESS NOTES
TRANSFER - IN REPORT: 
 
Verbal report received from [place] on Veleria Fitting  being received from CCU for routine progression of care Report consisted of patients Situation, Background, Assessment and  
Recommendations(SBAR). Information from the following report(s) SBAR, Kardex, ED Summary, Intake/Output, MAR, Accordion and Recent Results was reviewed with the receiving nurse. Opportunity for questions and clarification was provided. Patient arrived with bag full of clothes and personal belongings and small bag with personal items which also contained insulin needles and candy. Assessment completed upon patients arrival to unit and care assumed.

## 2019-04-05 NOTE — PROGRESS NOTES
1105 
Report called to Claudio Rodriguez 60 unit to Shaun Ding RN. 
 
8825 Verified Lantus dose of 22 units with Diabetic educator prior to giving. 1 Oren Berg Lantus 22 units given Rt arm. Refused ABD placement. [facility] Transported via [facility] [employer] 23 and RN, Merna Aguirre, to [facility], non-monitored. NS bag attached, to run at 125ml/hr upon arrival. 
 
1158 Cell phone  found and taken to pt by Merna Aguirre RN.

## 2019-04-05 NOTE — PROGRESS NOTES
**Consult Information** 
Member Facility: [facility] Facility MRN: [id number] Consult ID: [id number] Facility Time Zone: ET 
Date and Time of Consult: 04/04/2019 08:12:15 PM 
Requesting Clinician: Karen Loving Time of Call : 04/04/2019 08:15:00 PM 
Patient Name: Golden Modi Date of Birth: [birth year] Gender: Female **Clinical Note** Clinical Note: The pt's BG is 183 and she is on an insulin drip. Can you please add D5 to her existing fluids per the order? Also, the pt only has morphine ordered for pain. Can you please put in another pain medication? Thanks! Ordered D5 half-normal saline instead of half normal saline. Follow next BMP. Morphine is ordered for severe pain. Will add Norco for moderate pain.

## 2019-04-05 NOTE — PROGRESS NOTES
0900 Labs sent to lab, including [address] 
4899 Morphine for pain given.

## 2019-04-05 NOTE — CDMP QUERY
Account Number: [de-identified] MRN: [id number] Patient: Gregory Sr Created: [id number]:00 Clinician Name: Manny Post Rater : 
Patient admitted with DKA, noted to have sirs + maribel + met Enceph. If possible, please document in progress notes and d/c summary if you are evaluating and/or treating any of the following:  
 
=> SIRS of non-infectious origin with acute organ dysfunction 
=> Other explanation of clinical findings  
=> Clinically Undetermined (no explanation for clinical findings) The medical record reflects the following: 
   Risk Factors: maribel, DKA, Met encephalopathy Clinical Indicators: 5/5 pn--SIRS-non infectious likely secondary to DKA, POA,  
T 93, hr 116--126, rr 30-42, wbc 23 + left shift Treatment: insulin gtt, ivfs @ 125, ns3L, bicarb, ativan Thank Sophie Hussein Rn/CDMP

## 2019-04-06 LAB
ANION GAP SERPL CALC-SCNC: 5 MMOL/L (ref 5–15)
BASOPHILS # BLD: 0 K/UL (ref 0–0.1)
BASOPHILS NFR BLD: 0 % (ref 0–1)
BUN SERPL-MCNC: 4 MG/DL (ref 6–20)
BUN/CREAT SERPL: 8 (ref 12–20)
CALCIUM SERPL-MCNC: 7.8 MG/DL (ref 8.5–10.1)
CHLORIDE SERPL-SCNC: 115 MMOL/L (ref 97–108)
CO2 SERPL-SCNC: 22 MMOL/L (ref 21–32)
CREAT SERPL-MCNC: 0.49 MG/DL (ref 0.55–1.02)
DIFFERENTIAL METHOD BLD: ABNORMAL
EOSINOPHIL # BLD: 0 K/UL (ref 0–0.4)
EOSINOPHIL NFR BLD: 0 % (ref 0–7)
ERYTHROCYTE [DISTWIDTH] IN BLOOD BY AUTOMATED COUNT: 13.2 % (ref 11.5–14.5)
GLUCOSE BLD STRIP.AUTO-MCNC: 121 MG/DL (ref 65–100)
GLUCOSE BLD STRIP.AUTO-MCNC: 173 MG/DL (ref 65–100)
GLUCOSE BLD STRIP.AUTO-MCNC: 215 MG/DL (ref 65–100)
GLUCOSE BLD STRIP.AUTO-MCNC: 292 MG/DL (ref 65–100)
GLUCOSE SERPL-MCNC: 75 MG/DL (ref 65–100)
HCT VFR BLD AUTO: 33.2 % (ref 35–47)
HGB BLD-MCNC: 11.1 G/DL (ref 11.5–16)
IMM GRANULOCYTES # BLD AUTO: 0 K/UL (ref 0–0.04)
IMM GRANULOCYTES NFR BLD AUTO: 0 % (ref 0–0.5)
LYMPHOCYTES # BLD: 3.2 K/UL (ref 0.8–3.5)
LYMPHOCYTES NFR BLD: 42 % (ref 12–49)
MCH RBC QN AUTO: 27.1 PG (ref 26–34)
MCHC RBC AUTO-ENTMCNC: 33.4 G/DL (ref 30–36.5)
MCV RBC AUTO: 81 FL (ref 80–99)
MONOCYTES # BLD: 0.5 K/UL (ref 0–1)
MONOCYTES NFR BLD: 6 % (ref 5–13)
NEUTS SEG # BLD: 4 K/UL (ref 1.8–8)
NEUTS SEG NFR BLD: 52 % (ref 32–75)
NRBC # BLD: 0 K/UL (ref 0–0.01)
NRBC BLD-RTO: 0 PER 100 WBC
PLATELET # BLD AUTO: 220 K/UL (ref 150–400)
PMV BLD AUTO: 9.3 FL (ref 8.9–12.9)
POTASSIUM SERPL-SCNC: 2.8 MMOL/L (ref 3.5–5.1)
RBC # BLD AUTO: 4.1 M/UL (ref 3.8–5.2)
SERVICE CMNT-IMP: ABNORMAL
SODIUM SERPL-SCNC: 142 MMOL/L (ref 136–145)
WBC # BLD AUTO: 7.8 K/UL (ref 3.6–11)

## 2019-04-06 PROCEDURE — 74011250637 HC RX REV CODE- 250/637: Performed by: INTERNAL MEDICINE

## 2019-04-06 PROCEDURE — 85025 COMPLETE CBC W/AUTO DIFF WBC: CPT

## 2019-04-06 PROCEDURE — 36415 COLL VENOUS BLD VENIPUNCTURE: CPT

## 2019-04-06 PROCEDURE — 74011636637 HC RX REV CODE- 636/637: Performed by: HOSPITALIST

## 2019-04-06 PROCEDURE — 74011250636 HC RX REV CODE- 250/636: Performed by: INTERNAL MEDICINE

## 2019-04-06 PROCEDURE — 82962 GLUCOSE BLOOD TEST: CPT

## 2019-04-06 PROCEDURE — 74011250637 HC RX REV CODE- 250/637: Performed by: HOSPITALIST

## 2019-04-06 PROCEDURE — 65270000015 HC RM PRIVATE ONCOLOGY

## 2019-04-06 PROCEDURE — 80048 BASIC METABOLIC PNL TOTAL CA: CPT

## 2019-04-06 PROCEDURE — 74011250636 HC RX REV CODE- 250/636: Performed by: HOSPITALIST

## 2019-04-06 PROCEDURE — 74011636637 HC RX REV CODE- 636/637: Performed by: INTERNAL MEDICINE

## 2019-04-06 RX ORDER — SODIUM CHLORIDE 9 MG/ML
75 INJECTION, SOLUTION INTRAVENOUS CONTINUOUS
Status: DISCONTINUED | OUTPATIENT
Start: 2019-04-06 | End: 2019-04-06

## 2019-04-06 RX ORDER — MORPHINE SULFATE 2 MG/ML
1 INJECTION, SOLUTION INTRAMUSCULAR; INTRAVENOUS
Status: DISCONTINUED | OUTPATIENT
Start: 2019-04-06 | End: 2019-04-07

## 2019-04-06 RX ORDER — METOPROLOL TARTRATE 25 MG/1
12.5 TABLET, FILM COATED ORAL EVERY 12 HOURS
Status: DISCONTINUED | OUTPATIENT
Start: 2019-04-06 | End: 2019-04-06

## 2019-04-06 RX ORDER — ACETAMINOPHEN 325 MG/1
650 TABLET ORAL
Status: DISCONTINUED | OUTPATIENT
Start: 2019-04-06 | End: 2019-04-08 | Stop reason: HOSPADM

## 2019-04-06 RX ORDER — INSULIN GLARGINE 100 [IU]/ML
17 INJECTION, SOLUTION SUBCUTANEOUS DAILY
Status: DISCONTINUED | OUTPATIENT
Start: 2019-04-06 | End: 2019-04-06

## 2019-04-06 RX ORDER — POTASSIUM CHLORIDE 20 MEQ/1
40 TABLET, EXTENDED RELEASE ORAL 2 TIMES DAILY
Status: COMPLETED | OUTPATIENT
Start: 2019-04-06 | End: 2019-04-06

## 2019-04-06 RX ORDER — METOPROLOL TARTRATE 5 MG/5ML
1.25 INJECTION INTRAVENOUS
Status: DISCONTINUED | OUTPATIENT
Start: 2019-04-06 | End: 2019-04-08 | Stop reason: HOSPADM

## 2019-04-06 RX ORDER — METOPROLOL TARTRATE 25 MG/1
25 TABLET, FILM COATED ORAL EVERY 12 HOURS
Status: DISCONTINUED | OUTPATIENT
Start: 2019-04-06 | End: 2019-04-07

## 2019-04-06 RX ORDER — INSULIN GLARGINE 100 [IU]/ML
15 INJECTION, SOLUTION SUBCUTANEOUS DAILY
Status: DISCONTINUED | OUTPATIENT
Start: 2019-04-06 | End: 2019-04-07

## 2019-04-06 RX ADMIN — Medication 10 ML: at 13:50

## 2019-04-06 RX ADMIN — HYDROCODONE BITARTRATE AND ACETAMINOPHEN 1 TABLET: 5; 325 TABLET ORAL at 08:52

## 2019-04-06 RX ADMIN — INSULIN GLARGINE 15 UNITS: 100 INJECTION, SOLUTION SUBCUTANEOUS at 08:58

## 2019-04-06 RX ADMIN — INSULIN LISPRO 3 UNITS: 100 INJECTION, SOLUTION INTRAVENOUS; SUBCUTANEOUS at 21:33

## 2019-04-06 RX ADMIN — SODIUM CHLORIDE 125 ML/HR: 900 INJECTION, SOLUTION INTRAVENOUS at 05:57

## 2019-04-06 RX ADMIN — POTASSIUM CHLORIDE 40 MEQ: 20 TABLET, EXTENDED RELEASE ORAL at 17:19

## 2019-04-06 RX ADMIN — FAMOTIDINE 20 MG: 20 TABLET ORAL at 17:19

## 2019-04-06 RX ADMIN — METOPROLOL TARTRATE 25 MG: 25 TABLET ORAL at 21:34

## 2019-04-06 RX ADMIN — HYDROCODONE BITARTRATE AND ACETAMINOPHEN 1 TABLET: 5; 325 TABLET ORAL at 03:37

## 2019-04-06 RX ADMIN — MORPHINE SULFATE 1 MG: 2 INJECTION, SOLUTION INTRAMUSCULAR; INTRAVENOUS at 19:50

## 2019-04-06 RX ADMIN — ACETAMINOPHEN 650 MG: 325 TABLET ORAL at 17:25

## 2019-04-06 RX ADMIN — POTASSIUM CHLORIDE 40 MEQ: 20 TABLET, EXTENDED RELEASE ORAL at 09:01

## 2019-04-06 RX ADMIN — HEPARIN SODIUM 5000 UNITS: 5000 INJECTION INTRAVENOUS; SUBCUTANEOUS at 06:27

## 2019-04-06 RX ADMIN — HEPARIN SODIUM 5000 UNITS: 5000 INJECTION INTRAVENOUS; SUBCUTANEOUS at 21:34

## 2019-04-06 RX ADMIN — Medication 10 ML: at 21:34

## 2019-04-06 RX ADMIN — METOPROLOL TARTRATE 12.5 MG: 25 TABLET ORAL at 08:52

## 2019-04-06 RX ADMIN — Medication 10 ML: at 05:57

## 2019-04-06 RX ADMIN — INSULIN LISPRO 2 UNITS: 100 INJECTION, SOLUTION INTRAVENOUS; SUBCUTANEOUS at 12:17

## 2019-04-06 RX ADMIN — FAMOTIDINE 20 MG: 20 TABLET ORAL at 08:51

## 2019-04-06 RX ADMIN — MORPHINE SULFATE 1 MG: 2 INJECTION, SOLUTION INTRAMUSCULAR; INTRAVENOUS at 10:25

## 2019-04-06 RX ADMIN — INSULIN LISPRO 2 UNITS: 100 INJECTION, SOLUTION INTRAVENOUS; SUBCUTANEOUS at 17:19

## 2019-04-06 NOTE — PROGRESS NOTES
RAPID RESPONSE TEAM 
 
Rounded on patient due to recent transfer out of CCU on 04/05/19. Discussed with primary RN Henrik Chu. No acute concerns. No patient complaints. Vitals stable. Blood sugars within normal limits. MEWS 1. No RRT interventions indicated at this time. RN encouraged to call with any questions or concerns. Quinton Herrera Rapid Response RN Violeta Anand

## 2019-04-06 NOTE — PROGRESS NOTES
02/01/17 0900   Discharge disposition   Discharged to: Skilled Nurs   Name of Facillity/Home Care/Hospice ACUITY [facility]   Patient assessed for rehabilitation services?  Yes   Patient is Discharged to a [address] [place] [address] Yes   Discharge transportation Hospitalist Progress Note NAME: Maria E Segal :  1995 MRN:  [id number] Assessment / Plan: 
Diabetic ketoacidosis POA Anion gap metabolic acidosis due to above Diabetes mellitus uncontrolled with hyperglycemia Pseudohyponatremia, resolved Metabolic encephalopathy, resolved Noncompliance  
-admitted per DKA protocol, A1c 15.3 likely due to noncompliance 
-AG closed, bicarb wnl. Off insulin, on sq insulin but hypoglycemia. Will decrease lantus to 15 units daily, titrate prn once pt appetite improves   
-cont' SSI 
-stop IVF, encourage PO intake 
-morphine prn 
  
HTN with sinus tachycardia 
-start metoprolol 12.5mg BID Acute kidney injury, most likely prerenal 
-Cr 1.45->0.92, resolved with IV fluids, will stop IVF, encourage PO intake as pt is no longer nauseous  
  
Hypokalemia 
-K 2.8, replete with KCl 
  
SIRS-non infectious with acute organ dysfunction ( ANGIE) 
likely secondary to DKA, POA Leukocytosis likely reactive due to above LA 2.4->1.0, wbc down to 15 from 23 
-CT abd/pelvis neg for acute pathology 
-UA neg for infection  
-CXR neg for acute process 
-blood cx -NTD, zosyn discontinued on , pt remains afebrile 
  
Hypophosphatemia 
-improved with resolution of DKA 
  
GERD, pt says she has H/O GERD but not on any meds 
-cont' pepcid, started on this admission 
  
Hgb drop from 14 to 11.6 
-likely she was hemo-concentrated on admission, no s/s of bleed from anywhere, will monitor  
-Counseled regarding compliance Body mass index is 24.73 kg/m². therefore classifying patient as normal wt 
  
Code Status: Full Code Surrogate decision maker: Mother -Amador Nipple DVT Prophylaxis: Lovenox GI Prophylaxis: not indicated Subjective: Chief Complaint / Reason for Physician Visit Pt seen up in bed, NAD. Hypoglycemic. Advance diet Discussed with RN events overnight. Review of Systems: 
Symptom Y/N Comments  Symptom Y/N Comments Fever/Chills n   Chest Pain n   
Poor Appetite    Edema Cough    Abdominal Pain y Sputum    Joint Pain SOB/IVY n   Pruritis/Rash Nausea/vomit    Tolerating PT/OT Diarrhea    Tolerating Diet Constipation    Other Could NOT obtain due to:   
 
Objective: VITALS:  
Last 24hrs VS reviewed since prior progress note. Most recent are: 
Patient Vitals for the past 24 hrs: 
 Temp Pulse Resp BP SpO2  
04/06/19 0727 98.1 °F (36.7 °C) 95 16 (!) 136/100 100 % 04/05/19 2344 98.4 °F (36.9 °C) 98 18 (!) 140/97 100 % 04/05/19 1933 98.4 °F (36.9 °C) (!) 102 18 (!) 137/96 99 % 04/05/19 1548 98.2 °F (36.8 °C) (!) 104 18 135/80 97 % 04/05/19 1158 98.4 °F (36.9 °C) (!) 103 18 117/78 95 % 04/05/19 1100 98 °F (36.7 °C) (!) 108 19 128/88 100 % 04/05/19 1000  (!) 109 16 119/74 100 % 04/05/19 0913  (!) 106 18  100 % 04/05/19 0900  (!) 111 17 131/90 100 % Intake/Output Summary (Last 24 hours) at 4/6/2019 2045 Last data filed at 4/6/2019 0045 Gross per 24 hour Intake 4887.5 ml Output 700 ml Net 4187.5 ml PHYSICAL EXAM: 
General: WD, WN. Alert, cooperative, no acute distress   
EENT:  EOMI. Anicteric sclerae. MMM Resp:  CTA bilaterally, no wheezing or rales. No accessory muscle use CV:  tachycardic,  No edema GI:  Soft, Non distended, Non tender.  +Bowel sounds Neurologic:  Alert and oriented X 3, normal speech, Psych:   Good insight. Not anxious nor agitated Skin:  No rashes. No jaundice Reviewed most current lab test results and cultures  YES Reviewed most current radiology test results   YES Review and summation of old records today    NO Reviewed patient's current orders and MAR    YES 
PMH/SH reviewed - no change compared to H&P 
________________________________________________________________________ Care Plan discussed with: 
  Comments Patient x Family RN x Care Manager Consultant Multidiciplinary team rounds were held today with , nursing, pharmacist and clinical coordinator. Patient's plan of care was discussed; medications were reviewed and discharge planning was addressed. ________________________________________________________________________ Total NON critical care TIME:  35   Minutes Total CRITICAL CARE TIME Spent:   Minutes non procedure based Comments >50% of visit spent in counseling and coordination of care    
________________________________________________________________________ Jennyfer Miller MD  
 
Procedures: see electronic medical records for all procedures/Xrays and details which were not copied into this note but were reviewed prior to creation of Plan. LABS: 
I reviewed today's most current labs and imaging studies. Pertinent labs include: 
Recent Labs 04/06/19 
0554 04/05/19 
0222 04/04/19 
1106 WBC 7.8 15.3* 23.1* HGB 11.1* 11.6 14.3 HCT 33.2* 34.1* 45.7  279 512* Recent Labs 04/06/19 
5247 04/05/19 
8883 04/05/19 
0550 04/05/19 
0145  04/04/19 
1106  142 143 141   < > 135*  
K 2.8* 3.4* 3.1* 3.3*   < > 5.4*  
* 116* 116* 116*   < > 105 CO2 22 19* 19* 17*   < > 5* GLU 75 107* 119* 142*   < > 808* BUN 4* 12 13 15   < > 23* CREA 0.49* 0.92 0.87 0.92   < > 1.49* CA 7.8* 8.4* 8.0* 8.0*   < > 9.3 MG  --  2.2 2.2 2.2   < >  --   
PHOS  --  2.1* 2.0* 1.8*   < >  --   
ALB  --   --   --   --   --  3.5 TBILI  --   --   --   --   --  0.4 SGOT  --   --   --   --   --  17 ALT  --   --   --   --   --  36  
 < > = values in this interval not displayed.   
 
 
Signed: Jennyfer Miller MD

## 2019-04-06 NOTE — PROGRESS NOTES
Rec'd report from [facility] RN at bedside. Pt very quiet doesn't talk to staff/pt shows no eye contact when being talked too. Pt very withdrawn. When asked a question patient just shakes her head with any words coming out her mouth.

## 2019-04-06 NOTE — PROGRESS NOTES
Rapid Response Team: 
 
Rounded on patient due to tx from CCU on 4/5. Patient alert, but seems withdrawn and minimally interacts with RN and other nursing staff. Vital signs reviewed, POC and labs reviewed, chart reviewed. Patient hypokalemic on AM labs, but being treated please refer to [facility]  VSS. No RRT interventions indicated. Yunior Nava BSN, RN, CCRN, BEULAH, CPEN Rapid Response RN

## 2019-04-07 LAB
ANION GAP SERPL CALC-SCNC: 8 MMOL/L (ref 5–15)
BUN SERPL-MCNC: 5 MG/DL (ref 6–20)
BUN/CREAT SERPL: 9 (ref 12–20)
CALCIUM SERPL-MCNC: 8.5 MG/DL (ref 8.5–10.1)
CHLORIDE SERPL-SCNC: 107 MMOL/L (ref 97–108)
CO2 SERPL-SCNC: 24 MMOL/L (ref 21–32)
CREAT SERPL-MCNC: 0.53 MG/DL (ref 0.55–1.02)
GLUCOSE BLD STRIP.AUTO-MCNC: 230 MG/DL (ref 65–100)
GLUCOSE BLD STRIP.AUTO-MCNC: 260 MG/DL (ref 65–100)
GLUCOSE BLD STRIP.AUTO-MCNC: 286 MG/DL (ref 65–100)
GLUCOSE SERPL-MCNC: 306 MG/DL (ref 65–100)
MAGNESIUM SERPL-MCNC: 2.1 MG/DL (ref 1.6–2.4)
PHOSPHATE SERPL-MCNC: 2.7 MG/DL (ref 2.6–4.7)
POTASSIUM SERPL-SCNC: 3 MMOL/L (ref 3.5–5.1)
SERVICE CMNT-IMP: ABNORMAL
SODIUM SERPL-SCNC: 139 MMOL/L (ref 136–145)

## 2019-04-07 PROCEDURE — 36415 COLL VENOUS BLD VENIPUNCTURE: CPT

## 2019-04-07 PROCEDURE — 80048 BASIC METABOLIC PNL TOTAL CA: CPT

## 2019-04-07 PROCEDURE — 83735 ASSAY OF MAGNESIUM: CPT

## 2019-04-07 PROCEDURE — 74011636637 HC RX REV CODE- 636/637: Performed by: HOSPITALIST

## 2019-04-07 PROCEDURE — 74011250636 HC RX REV CODE- 250/636: Performed by: INTERNAL MEDICINE

## 2019-04-07 PROCEDURE — 74011250637 HC RX REV CODE- 250/637: Performed by: INTERNAL MEDICINE

## 2019-04-07 PROCEDURE — 74011250637 HC RX REV CODE- 250/637: Performed by: HOSPITALIST

## 2019-04-07 PROCEDURE — 82962 GLUCOSE BLOOD TEST: CPT

## 2019-04-07 PROCEDURE — 74011636637 HC RX REV CODE- 636/637: Performed by: INTERNAL MEDICINE

## 2019-04-07 PROCEDURE — 84100 ASSAY OF PHOSPHORUS: CPT

## 2019-04-07 PROCEDURE — 65270000015 HC RM PRIVATE ONCOLOGY

## 2019-04-07 RX ORDER — INSULIN GLARGINE 100 [IU]/ML
28 INJECTION, SOLUTION SUBCUTANEOUS DAILY
Status: DISCONTINUED | OUTPATIENT
Start: 2019-04-08 | End: 2019-04-08 | Stop reason: HOSPADM

## 2019-04-07 RX ORDER — POTASSIUM CHLORIDE 20 MEQ/1
40 TABLET, EXTENDED RELEASE ORAL
Status: DISCONTINUED | OUTPATIENT
Start: 2019-04-07 | End: 2019-04-07

## 2019-04-07 RX ORDER — HYDROCODONE BITARTRATE AND ACETAMINOPHEN 5; 325 MG/1; MG/1
1 TABLET ORAL
Status: DISCONTINUED | OUTPATIENT
Start: 2019-04-07 | End: 2019-04-08 | Stop reason: HOSPADM

## 2019-04-07 RX ORDER — METOPROLOL TARTRATE 50 MG/1
50 TABLET ORAL EVERY 12 HOURS
Status: DISCONTINUED | OUTPATIENT
Start: 2019-04-07 | End: 2019-04-08 | Stop reason: HOSPADM

## 2019-04-07 RX ORDER — INSULIN GLARGINE 100 [IU]/ML
22 INJECTION, SOLUTION SUBCUTANEOUS DAILY
Status: DISCONTINUED | OUTPATIENT
Start: 2019-04-07 | End: 2019-04-07

## 2019-04-07 RX ORDER — POTASSIUM CHLORIDE 20 MEQ/1
40 TABLET, EXTENDED RELEASE ORAL 2 TIMES DAILY
Status: COMPLETED | OUTPATIENT
Start: 2019-04-07 | End: 2019-04-07

## 2019-04-07 RX ADMIN — INSULIN LISPRO 4 UNITS: 100 INJECTION, SOLUTION INTRAVENOUS; SUBCUTANEOUS at 21:49

## 2019-04-07 RX ADMIN — Medication 10 ML: at 06:17

## 2019-04-07 RX ADMIN — FAMOTIDINE 20 MG: 20 TABLET ORAL at 17:12

## 2019-04-07 RX ADMIN — Medication 10 ML: at 21:52

## 2019-04-07 RX ADMIN — Medication 10 ML: at 13:13

## 2019-04-07 RX ADMIN — FAMOTIDINE 20 MG: 20 TABLET ORAL at 08:25

## 2019-04-07 RX ADMIN — POTASSIUM CHLORIDE 40 MEQ: 20 TABLET, EXTENDED RELEASE ORAL at 10:07

## 2019-04-07 RX ADMIN — METOPROLOL TARTRATE 50 MG: 50 TABLET ORAL at 08:27

## 2019-04-07 RX ADMIN — MORPHINE SULFATE 1 MG: 2 INJECTION, SOLUTION INTRAMUSCULAR; INTRAVENOUS at 12:28

## 2019-04-07 RX ADMIN — INSULIN GLARGINE 22 UNITS: 100 INJECTION, SOLUTION SUBCUTANEOUS at 08:25

## 2019-04-07 RX ADMIN — MORPHINE SULFATE 1 MG: 2 INJECTION, SOLUTION INTRAMUSCULAR; INTRAVENOUS at 21:52

## 2019-04-07 RX ADMIN — METOPROLOL TARTRATE 50 MG: 50 TABLET ORAL at 21:49

## 2019-04-07 RX ADMIN — INSULIN LISPRO 5 UNITS: 100 INJECTION, SOLUTION INTRAVENOUS; SUBCUTANEOUS at 17:12

## 2019-04-07 RX ADMIN — HEPARIN SODIUM 5000 UNITS: 5000 INJECTION INTRAVENOUS; SUBCUTANEOUS at 06:17

## 2019-04-07 RX ADMIN — MORPHINE SULFATE 1 MG: 2 INJECTION, SOLUTION INTRAMUSCULAR; INTRAVENOUS at 03:41

## 2019-04-07 RX ADMIN — INSULIN LISPRO 3 UNITS: 100 INJECTION, SOLUTION INTRAVENOUS; SUBCUTANEOUS at 12:32

## 2019-04-07 RX ADMIN — POTASSIUM CHLORIDE 40 MEQ: 20 TABLET, EXTENDED RELEASE ORAL at 17:12

## 2019-04-07 RX ADMIN — INSULIN LISPRO 5 UNITS: 100 INJECTION, SOLUTION INTRAVENOUS; SUBCUTANEOUS at 08:26

## 2019-04-07 NOTE — PROGRESS NOTES
Hospitalist Progress Note NAME: Jimmy Garcia :  1995 MRN:  [id number] Assessment / Plan: 
Diabetic ketoacidosis POA Anion gap metabolic acidosis due to above Diabetes mellitus uncontrolled with hyperglycemia Pseudohyponatremia, resolved Metabolic encephalopathy, resolved Noncompliance  
-admitted per DKA protocol, A1c 15.3 likely due to noncompliance 
-AG closed, bicarb wnl. Off insulin, on sq insulin . Tolerating diet so now hyperglycemia 
-will increase lantus, plan to resume home dose if tolreated 
-cont' SSI 
-morphine prn 
-plan for discharge in the AM, discussed medication compliance with pt 
-ambulate/up in chair HTN with sinus tachycardia 
-incr metoprolol for better BP control Acute kidney injury, most likely prerenal 
-Cr 1.45->0.92, resolved with IV fluids, will stop IVF, encourage PO intake as pt is no longer nauseous Hypokalemia 
-K 3.0, will replete with KCl x2 
  
SIRS-non infectious with acute organ dysfunction ( ANGIE) 
likely secondary to DKA, POA Leukocytosis likely reactive due to above LA 2.4->1.0, wbc down to 15 from 23 
-CT abd/pelvis neg for acute pathology 
-UA neg for infection  
-CXR neg for acute process 
-blood cx -NTD, zosyn discontinued on , pt remains afebrile 
  
Hypophosphatemia 
-improved with resolution of DKA 
  
GERD, pt says she has H/O GERD but not on any meds 
-cont' pepcid, started on this admission 
  
Hgb drop from 14 to 11.6 
-likely she was hemo-concentrated on admission, no s/s of bleed from anywhere, will monitor   
-Counseled regarding compliance Body mass index is 24.73 kg/m². therefore classifying patient as normal wt 
  
Code Status: Full Code Surrogate decision maker: Mother -Nas Gregory DVT Prophylaxis: Lovenox GI Prophylaxis: not indicated Subjective: Chief Complaint / Reason for Physician Visit Pt seen up in bed, NAD. Hyperglycemic. Tolerating diet Discussed with RN events overnight. Review of Systems: 
Symptom Y/N Comments  Symptom Y/N Comments Fever/Chills n   Chest Pain n   
Poor Appetite    Edema Cough    Abdominal Pain y Sputum    Joint Pain SOB/IVY n   Pruritis/Rash Nausea/vomit    Tolerating PT/OT Diarrhea    Tolerating Diet Constipation    Other Could NOT obtain due to:   
 
Objective: VITALS:  
Last 24hrs VS reviewed since prior progress note. Most recent are: 
Patient Vitals for the past 24 hrs: 
 Temp Pulse Resp BP SpO2  
04/07/19 1010 98.4 °F (36.9 °C) 84 14 127/82 98 % 04/07/19 0921   18 (!) 135/97 98 % 04/07/19 0745 98.5 °F (36.9 °C) 84 18 (!) 158/108 99 % 04/06/19 2251 98.1 °F (36.7 °C) 85 18 147/87 100 % 04/06/19 1938 98.4 °F (36.9 °C) 99 18 131/83 100 % 04/06/19 1524 98.4 °F (36.9 °C) 97 18 (!) 140/94 97 % Intake/Output Summary (Last 24 hours) at 4/7/2019 1458 Last data filed at 4/7/2019 9951 Gross per 24 hour Intake 1000 ml Output  Net 1000 ml PHYSICAL EXAM: 
General: WD, WN. Alert, cooperative, no acute distress   
EENT:  EOMI. Anicteric sclerae. MMM Resp:  CTA bilaterally, no wheezing or rales. No accessory muscle use CV:  tachycardic,  No edema GI:  Soft, Non distended, Non tender.  +Bowel sounds Neurologic:  Alert and oriented X 3, normal speech, Psych:   Good insight. Not anxious nor agitated Skin:  No rashes. No jaundice Reviewed most current lab test results and cultures  YES Reviewed most current radiology test results   YES Review and summation of old records today    NO Reviewed patient's current orders and MAR    YES 
PMH/SH reviewed - no change compared to H&P 
________________________________________________________________________ Care Plan discussed with: 
  Comments Patient x Family RN x Care Manager Consultant Multidiciplinary team rounds were held today with , nursing, pharmacist and clinical coordinator.   Patient's plan of care was discussed; medications were reviewed and discharge planning was addressed. ________________________________________________________________________ Total NON critical care TIME:  35   Minutes Total CRITICAL CARE TIME Spent:   Minutes non procedure based Comments >50% of visit spent in counseling and coordination of care    
________________________________________________________________________ Tran Joyner MD  
 
Procedures: see electronic medical records for all procedures/Xrays and details which were not copied into this note but were reviewed prior to creation of Plan. LABS: 
I reviewed today's most current labs and imaging studies. Pertinent labs include: 
Recent Labs 04/06/19 
0636 04/05/19 
0222 WBC 7.8 15.3* HGB 11.1* 11.6 HCT 33.2* 34.1*  
 279 Recent Labs 04/07/19 
6452 04/06/19 
4669 04/05/19 
1265 04/05/19 
6591  142 142 143  
K 3.0* 2.8* 3.4* 3.1*  
 115* 116* 116* CO2 24 22 19* 19* * 75 107* 119* BUN 5* 4* 12 13 CREA 0.53* 0.49* 0.92 0.87 CA 8.5 7.8* 8.4* 8.0*  
MG 2.1  --  2.2 2.2 PHOS 2.7  --  2.1* 2.0* Signed: Tran Joyner MD

## 2019-04-08 VITALS
RESPIRATION RATE: 16 BRPM | TEMPERATURE: 97.8 F | BODY MASS INDEX: 24.76 KG/M2 | HEART RATE: 75 BPM | DIASTOLIC BLOOD PRESSURE: 95 MMHG | WEIGHT: 148.59 LBS | HEIGHT: 65 IN | OXYGEN SATURATION: 100 % | SYSTOLIC BLOOD PRESSURE: 149 MMHG

## 2019-04-08 LAB
ANION GAP SERPL CALC-SCNC: 6 MMOL/L (ref 5–15)
BUN SERPL-MCNC: 8 MG/DL (ref 6–20)
BUN/CREAT SERPL: 15 (ref 12–20)
CALCIUM SERPL-MCNC: 9 MG/DL (ref 8.5–10.1)
CHLORIDE SERPL-SCNC: 105 MMOL/L (ref 97–108)
CO2 SERPL-SCNC: 27 MMOL/L (ref 21–32)
CREAT SERPL-MCNC: 0.52 MG/DL (ref 0.55–1.02)
GLUCOSE BLD STRIP.AUTO-MCNC: 289 MG/DL (ref 65–100)
GLUCOSE BLD STRIP.AUTO-MCNC: 316 MG/DL (ref 65–100)
GLUCOSE BLD STRIP.AUTO-MCNC: 325 MG/DL (ref 65–100)
GLUCOSE SERPL-MCNC: 294 MG/DL (ref 65–100)
POTASSIUM SERPL-SCNC: 3.2 MMOL/L (ref 3.5–5.1)
SERVICE CMNT-IMP: ABNORMAL
SODIUM SERPL-SCNC: 138 MMOL/L (ref 136–145)

## 2019-04-08 PROCEDURE — 74011250637 HC RX REV CODE- 250/637: Performed by: HOSPITALIST

## 2019-04-08 PROCEDURE — 74011250637 HC RX REV CODE- 250/637: Performed by: INTERNAL MEDICINE

## 2019-04-08 PROCEDURE — 82962 GLUCOSE BLOOD TEST: CPT

## 2019-04-08 PROCEDURE — 80048 BASIC METABOLIC PNL TOTAL CA: CPT

## 2019-04-08 PROCEDURE — 74011636637 HC RX REV CODE- 636/637: Performed by: HOSPITALIST

## 2019-04-08 PROCEDURE — 36415 COLL VENOUS BLD VENIPUNCTURE: CPT

## 2019-04-08 PROCEDURE — 74011636637 HC RX REV CODE- 636/637: Performed by: INTERNAL MEDICINE

## 2019-04-08 RX ORDER — POTASSIUM CHLORIDE 20 MEQ/1
40 TABLET, EXTENDED RELEASE ORAL
Status: COMPLETED | OUTPATIENT
Start: 2019-04-08 | End: 2019-04-08

## 2019-04-08 RX ORDER — METOPROLOL TARTRATE 50 MG/1
50 TABLET ORAL EVERY 12 HOURS
Qty: 60 TAB | Refills: 0 | Status: SHIPPED | OUTPATIENT
Start: 2019-04-08

## 2019-04-08 RX ORDER — FAMOTIDINE 20 MG/1
20 TABLET, FILM COATED ORAL 2 TIMES DAILY
Qty: 60 TAB | Refills: 0 | Status: SHIPPED | OUTPATIENT
Start: 2019-04-08

## 2019-04-08 RX ADMIN — INSULIN LISPRO 5 UNITS: 100 INJECTION, SOLUTION INTRAVENOUS; SUBCUTANEOUS at 08:24

## 2019-04-08 RX ADMIN — FAMOTIDINE 20 MG: 20 TABLET ORAL at 08:22

## 2019-04-08 RX ADMIN — POTASSIUM CHLORIDE 40 MEQ: 20 TABLET, EXTENDED RELEASE ORAL at 08:31

## 2019-04-08 RX ADMIN — HYDROCODONE BITARTRATE AND ACETAMINOPHEN 1 TABLET: 5; 325 TABLET ORAL at 08:31

## 2019-04-08 RX ADMIN — METOPROLOL TARTRATE 50 MG: 50 TABLET ORAL at 08:22

## 2019-04-08 RX ADMIN — INSULIN LISPRO 7 UNITS: 100 INJECTION, SOLUTION INTRAVENOUS; SUBCUTANEOUS at 11:32

## 2019-04-08 RX ADMIN — Medication 10 ML: at 05:41

## 2019-04-08 RX ADMIN — HYDROCODONE BITARTRATE AND ACETAMINOPHEN 1 TABLET: 5; 325 TABLET ORAL at 00:19

## 2019-04-08 RX ADMIN — INSULIN GLARGINE 28 UNITS: 100 INJECTION, SOLUTION SUBCUTANEOUS at 08:24

## 2019-04-08 NOTE — PROGRESS NOTES
Problem: Falls - Risk of 
Goal: *Absence of Falls Description Document Thanh Song Fall Risk and appropriate interventions in the flowsheet. 4/8/2019 1330 by Bonita Conde Outcome: Resolved/Met 
4/8/2019 1149 by Bonita Conde Outcome: Progressing Towards Goal

## 2019-04-08 NOTE — ED NOTES
Patients bag transported to CCU along with a pink metallic haroldo pack with ID inside.  Patient had two bags transported up to CCU

## 2019-04-08 NOTE — PROGRESS NOTES
Reason for Admission:   DKA RRAT Score:     4 Plan for utilizing home health:      No prior HH/SNF in the past.   
                 
Current Advanced Directive/Advance Care Plan:  FULL Code. Pt voiced no Advanced Medical Directive. Parents would be her decision makers. Likelihood of Readmission:  Low Transition of Care Plan:   Pt resides in [place]. Family to transport back to [place]; and will follow up w/her PCP there. SW to continue to assist.   
 
No Patient Care Coordination Note on file.  
 
 
JAYLEEN Braswell

## 2019-04-08 NOTE — PROGRESS NOTES
Problem: Falls - Risk of 
Goal: *Absence of Falls Description Document Maury Leung Fall Risk and appropriate interventions in the flowsheet.  
Outcome: Progressing Towards Goal

## 2019-04-08 NOTE — DISCHARGE SUMMARY
Discharge Summary      Name: Gold Morales  [id number]  YOB: 1995 (Age: 24 y.o.)   Date of Admission: 4/4/2019  Date of Discharge: 4/8/2019  Attending Physician: Cristy Duke MD    Discharge Diagnosis:   Diabetic ketoacidosis POA in the setting of T1DM diagnosed at 6 yo. Anion gap metabolic acidosis due to above  Diabetes mellitus uncontrolled with hyperglycemia  Pseudohyponatremia, resolved  Metabolic encephalopathy, resolved  Noncompliance    HTN with sinus tachycardia  Acute kidney injury, most likely prerenal  Hypokalemia   SIRS-non infectious with acute organ dysfunction ( ANGIE)  likely secondary to DKA, POA   Leukocytosis likely reactive due to above  Hypophosphatemia   GERD  Anemia    Consultations:  None    Brief Admission History/Reason for Admission Per General Nataliia MD:   Anastacia Bernheim is a 24 y.o.  female with known history as listed below presents to ED with complaint noted above. Available records were reviewed at the time of H&P. This is a 58-year-old female with past medical history of diabetes mellitus insulin-dependent who was sent to the ED via EMS for nausea vomiting and abdominal pain and Ams .  Per EMS note, patient was found screaming in 1 of the [facility] room, patient reportedly haven't  been using her insulin for the past 3-4 days for unknown reason. Patient is from out of town and traveling to [facility] to work as an employee   All the HPI obtained from the ED notes as patient was found confused and unable to contribute any history. In the ED, patient was found to be tachycardic, her labs showed elevated white blood cell count 23,000 with a left shift, anion gap metabolic acidosis concerning for DKA.  Her blood sugar was able 800.  Her VBG showed severe metabolic acidosis with pH less than 7.  Her creatinine 1.49  When seen, patient was restless screaming and holding her abdomen.   We were asked to see for work up and evaluation of the above problems.      Brief Hospital Course by Main Problems:   Diabetic ketoacidosis POA in the setting of T1DM diagnosed at 6 yo. Anion gap metabolic acidosis due to above  Diabetes mellitus uncontrolled with hyperglycemia  Pseudohyponatremia, resolved  Metabolic encephalopathy, resolved  Noncompliance   Pt is from [place], came to [place] for a job this week but ended up getting admitted for DKA. Her A1c 15.3, likely due to noncompliance. Pt was started on insulin gtt, and aggressive fluid resuscitation. Infectious work up was neg to include neg UA, neg CXR, CT a/p with no acute intraperitoneal process is identified. Head Ct neg for acute process. After insulin gtt and fluid resuscitation, her anion gap closed with bicarb normalized. Pt was transitioned to subQ lantus. Tolerating diet. She reports compliance with meds. She states she has enough supplies and medicine for her diabetes and does not need new prescription. Pt is due to see her endocrinologist at the end of the month in [place]. Discussed compliance with meds and f/u with her home PCP/endocrinologist upon returning home (this week). Pt medically stable for discharge. -AG closed, bicarb wnl. Off insulin, on sq insulin . Tolerating diet so now hyperglycemia  -will increase lantus, plan to resume home dose if tolreated  -cont' SSI  -morphine prn  -plan for discharge in the AM, discussed medication compliance with pt  -ambulate/up in chair     HTN with sinus tachycardia  Started on metoprolol 50mg BID. Acute kidney injury, most likely prerenal  Cr 1.45->0.92, resolved with IV fluids. Pt is tolerating PO intake.     Hypokalemia, repleted.     SIRS-non infectious with acute organ dysfunction ( ANGIE)  likely secondary to DKA, POA   Leukocytosis likely reactive due to above  LA 2.4->1.0, wbc down to 15 from 2. CT abd/pelvis neg for acute pathology. UA neg for infection. CXR neg for acute process.   Blood cx NTD, zosyn discontinued on 4/6, pt remains afebrile for >48hrs.     Hypophosphatemia  Improved with resolution of DKA     GERD, pt says she has H/O GERD but not on any meds. Cont' pepcid, started on this admission     Anemia   Likely she was hemo-concentrated on admission, no s/s of bleed from anywhere, hgb stable. Body mass index is 24.73 kg/m². therefore classifying patient as normal wt       Discharge Exam:  Patient seen and examined by me on discharge day. Pertinent Findings:  Visit Vitals  BP (!) 149/95 (BP 1 Location: Right arm, BP Patient Position: At rest)   Pulse 75   Temp 97.8 °F (36.6 °C)   Resp 16   Ht 5' 5\" (1.651 m)   Wt 67.4 kg (148 lb 9.4 oz)   SpO2 100%   BMI 24.73 kg/m²     Gen:    Not in distress  Chest: Clear lungs  CVS:   Regular rhythm. No edema  Abd:  Soft, not distended, not tender    Discharge/Recent Laboratory Results:  Recent Labs     04/08/19  0532 04/07/19  0344    139   K 3.2* 3.0*    107   CO2 27 24   BUN 8 5*   * 306*   CA 9.0 8.5   PHOS  --  2.7   MG  --  2.1     Recent Labs     04/06/19  0554   HGB 11.1*   HCT 33.2*   WBC 7.8          Discharge Medications:  Current Discharge Medication List      START taking these medications    Details   famotidine (PEPCID) 20 mg tablet Take 1 Tab by mouth two (2) times a day. Qty: 60 Tab, Refills: 0      metoprolol tartrate (LOPRESSOR) 50 mg tablet Take 1 Tab by mouth every twelve (12) hours. Qty: 60 Tab, Refills: 0         CONTINUE these medications which have NOT CHANGED    Details   insulin glargine (LANTUS SOLOSTAR U-100 INSULIN) 100 unit/mL (3 mL) inpn 28 Units by SubCUTAneous route nightly.       insulin lispro (HUMALOG KWIKPEN INSULIN) 100 unit/mL kwikpen 1 unit for every 5 grams of carbohydrate eaten   Also has correction scale, but pt cannot recall scale      ibuprofen (MOTRIN) 200 mg tablet              DISPOSITION:    Home with Family: x   Home with HH/PT/OT/RN:    SNF/LTC:    [facility]:    OTHER:          Follow up with:   PCP : Unknown, Provider  Follow-up Information     Follow up With Specialties Details Why Contact Info    Unknown, Provider    Patient not available to ask            Code: full  Diet: diabetic    Total time in minutes spent coordinating this discharge (includes going over instructions, follow-up, prescriptions, and preparing report for sign off to her PCP) :  35 minutes

## 2019-04-08 NOTE — PROGRESS NOTES
Oncology End of Shift Note Bedside shift change report given to Vicenta Walters RN (incoming nurse) by Ofelia Sung RN (outgoing nurse) on Daxa Blow. Report included the following information SBAR, Kardex, MAR and Accordion. Shift Summary: Norco once for abdominal pain, am labs drawn, possible d/c today Issues for Physician to Address:   
 
Patient on Cardiac Monitoring? [] Yes 
[x] No 
 
Rhythm:   
 
 
 
Shift Events Ofelia Sung RN

## 2019-04-10 LAB
BACTERIA SPEC CULT: NORMAL
SERVICE CMNT-IMP: NORMAL